# Patient Record
Sex: FEMALE | Race: ASIAN | Employment: PART TIME | URBAN - METROPOLITAN AREA
[De-identification: names, ages, dates, MRNs, and addresses within clinical notes are randomized per-mention and may not be internally consistent; named-entity substitution may affect disease eponyms.]

---

## 2017-04-03 RX ORDER — ESCITALOPRAM OXALATE 10 MG/1
TABLET ORAL
Qty: 30 TAB | Refills: 5 | OUTPATIENT
Start: 2017-04-03

## 2017-06-22 RX ORDER — ESCITALOPRAM OXALATE 10 MG/1
TABLET ORAL
Qty: 30 TAB | Refills: 5 | OUTPATIENT
Start: 2017-06-22

## 2017-06-28 ENCOUNTER — OFFICE VISIT (OUTPATIENT)
Dept: INTERNAL MEDICINE CLINIC | Age: 52
End: 2017-06-28

## 2017-06-28 VITALS
RESPIRATION RATE: 14 BRPM | WEIGHT: 173.4 LBS | TEMPERATURE: 97.4 F | HEART RATE: 109 BPM | SYSTOLIC BLOOD PRESSURE: 128 MMHG | HEIGHT: 64 IN | DIASTOLIC BLOOD PRESSURE: 77 MMHG | BODY MASS INDEX: 29.6 KG/M2 | OXYGEN SATURATION: 99 %

## 2017-06-28 DIAGNOSIS — Z00.00 ROUTINE GENERAL MEDICAL EXAMINATION AT A HEALTH CARE FACILITY: ICD-10-CM

## 2017-06-28 DIAGNOSIS — F34.1 DYSTHYMIA: Primary | ICD-10-CM

## 2017-06-28 RX ORDER — ESCITALOPRAM OXALATE 10 MG/1
TABLET ORAL
Qty: 90 TAB | Refills: 3 | Status: SHIPPED | OUTPATIENT
Start: 2017-06-28 | End: 2017-09-26

## 2017-06-28 NOTE — PROGRESS NOTES
HISTORY OF PRESENT ILLNESS  Kin Late is a 46 y.o. female. HPI  Patient presents today for follow up. She states that her mood is doing well on the Lexapro. She still sees  occasionally and was told she would need lifelong medicine given her genetics - she is working on exercise- working a lot- and feeling happy and content  Patient is exercising and preparing for half marathon. Patient reports that she occasionally follows up with Dr. Eusebia Guo. Review of Systems   All other systems reviewed and are negative. Physical Exam   Constitutional: She is oriented to person, place, and time. She appears well-developed and well-nourished. HENT:   Head: Normocephalic and atraumatic. Right Ear: External ear normal.   Left Ear: External ear normal.   Nose: Nose normal.   Mouth/Throat: Oropharynx is clear and moist.   Eyes: Conjunctivae and EOM are normal.   Neck: Normal range of motion. Neck supple. Carotid bruit is not present. No thyroid mass and no thyromegaly present. Cardiovascular: Normal rate, regular rhythm, S1 normal, S2 normal, normal heart sounds and intact distal pulses. Pulmonary/Chest: Effort normal and breath sounds normal.   Abdominal: Soft. Normal appearance and bowel sounds are normal. There is no hepatosplenomegaly. There is no tenderness. Musculoskeletal: Normal range of motion. Neurological: She is alert and oriented to person, place, and time. She has normal strength. No cranial nerve deficit or sensory deficit. Coordination normal.   Skin: Skin is warm, dry and intact. No abrasion and no rash noted. Psychiatric: She has a normal mood and affect. Her behavior is normal. Judgment and thought content normal.   Nursing note and vitals reviewed. ASSESSMENT and PLAN  Jaime Rangel was seen today for medication evaluation. Diagnoses and all orders for this visit:    Dysthymia  Stable. Continue on Lexapro.  We will not talk about ocming off of it as per pt she never knew she could feel this good    Routine general medical examination at a health care facility- f/u October for physical  -     CBC W/O DIFF  -     METABOLIC PANEL, COMPREHENSIVE  -     LIPID PANEL  -     TSH 3RD GENERATION    Other orders  -     escitalopram oxalate (LEXAPRO) 10 mg tablet; TAKE 1 TAB BY MOUTH DAILY. lab results and schedule of future lab studies reviewed with patient  reviewed diet, exercise and weight control    Written by Ray Warren, as dictated by Nathanael Vo MD.     Current diagnosis and concerns discussed with pt at length. Understands risks and benefits or current treatment plan and medications and accepts the treatment and medication with any possible risks. Pt asks appropriate questions which were answered. Pt instructed to call with any concerns or problems.

## 2017-09-13 DIAGNOSIS — Z00.00 ROUTINE GENERAL MEDICAL EXAMINATION AT A HEALTH CARE FACILITY: Primary | ICD-10-CM

## 2017-09-14 ENCOUNTER — TELEPHONE (OUTPATIENT)
Dept: INTERNAL MEDICINE CLINIC | Age: 52
End: 2017-09-14

## 2017-09-14 LAB
25(OH)D3+25(OH)D2 SERPL-MCNC: 34.1 NG/ML (ref 30–100)
ALBUMIN SERPL-MCNC: 4.3 G/DL (ref 3.5–5.5)
ALBUMIN/GLOB SERPL: 1.7 {RATIO} (ref 1.2–2.2)
ALP SERPL-CCNC: 78 IU/L (ref 39–117)
ALT SERPL-CCNC: 19 IU/L (ref 0–32)
AST SERPL-CCNC: 18 IU/L (ref 0–40)
BILIRUB SERPL-MCNC: 0.8 MG/DL (ref 0–1.2)
BUN SERPL-MCNC: 10 MG/DL (ref 6–24)
BUN/CREAT SERPL: 15 (ref 9–23)
CALCIUM SERPL-MCNC: 9 MG/DL (ref 8.7–10.2)
CHLORIDE SERPL-SCNC: 101 MMOL/L (ref 96–106)
CHOLEST SERPL-MCNC: 254 MG/DL (ref 100–199)
CO2 SERPL-SCNC: 25 MMOL/L (ref 18–29)
CREAT SERPL-MCNC: 0.66 MG/DL (ref 0.57–1)
ERYTHROCYTE [DISTWIDTH] IN BLOOD BY AUTOMATED COUNT: 12.6 % (ref 12.3–15.4)
GLOBULIN SER CALC-MCNC: 2.5 G/DL (ref 1.5–4.5)
GLUCOSE SERPL-MCNC: 98 MG/DL (ref 65–99)
HCT VFR BLD AUTO: 41.7 % (ref 34–46.6)
HDLC SERPL-MCNC: 65 MG/DL
HGB BLD-MCNC: 13.7 G/DL (ref 11.1–15.9)
INTERPRETATION, 910389: NORMAL
LDLC SERPL CALC-MCNC: 164 MG/DL (ref 0–99)
MCH RBC QN AUTO: 31.4 PG (ref 26.6–33)
MCHC RBC AUTO-ENTMCNC: 32.9 G/DL (ref 31.5–35.7)
MCV RBC AUTO: 95 FL (ref 79–97)
PLATELET # BLD AUTO: 354 X10E3/UL (ref 150–379)
POTASSIUM SERPL-SCNC: 4.5 MMOL/L (ref 3.5–5.2)
PROT SERPL-MCNC: 6.8 G/DL (ref 6–8.5)
RBC # BLD AUTO: 4.37 X10E6/UL (ref 3.77–5.28)
SODIUM SERPL-SCNC: 141 MMOL/L (ref 134–144)
TRIGL SERPL-MCNC: 125 MG/DL (ref 0–149)
TSH SERPL DL<=0.005 MIU/L-ACNC: 2.65 UIU/ML (ref 0.45–4.5)
VLDLC SERPL CALC-MCNC: 25 MG/DL (ref 5–40)
WBC # BLD AUTO: 4.7 X10E3/UL (ref 3.4–10.8)

## 2017-09-14 NOTE — TELEPHONE ENCOUNTER
Patient request a return call regarding concerns with her labs being processed.  Patient best contact  [phone] (P)

## 2017-09-26 ENCOUNTER — OFFICE VISIT (OUTPATIENT)
Dept: INTERNAL MEDICINE CLINIC | Age: 52
End: 2017-09-26

## 2017-09-26 VITALS
TEMPERATURE: 98.2 F | HEART RATE: 73 BPM | BODY MASS INDEX: 29.5 KG/M2 | OXYGEN SATURATION: 98 % | WEIGHT: 172.8 LBS | DIASTOLIC BLOOD PRESSURE: 71 MMHG | RESPIRATION RATE: 14 BRPM | SYSTOLIC BLOOD PRESSURE: 112 MMHG | HEIGHT: 64 IN

## 2017-09-26 DIAGNOSIS — Z00.00 ROUTINE GENERAL MEDICAL EXAMINATION AT A HEALTH CARE FACILITY: Primary | ICD-10-CM

## 2017-09-26 RX ORDER — ROSUVASTATIN CALCIUM 5 MG/1
5 TABLET, COATED ORAL
Qty: 30 TAB | Refills: 2 | Status: SHIPPED | OUTPATIENT
Start: 2017-09-26 | End: 2017-12-23 | Stop reason: SDUPTHER

## 2017-09-26 NOTE — PROGRESS NOTES
HISTORY OF PRESENT ILLNESS  Byron Contreras is a 46 y.o. female. HPI  Dysthymia- cont with lexapro and doing well - she is exercising and she is running and walking, going to gym lifting weights  Her lipids are high according to her labs but she does not want to be on medicine for this due to side effects and concerns as to what it means long term as she does not like meds- she is exercising, active- and feeling well but mom had MI at age 54  Review of Systems   Constitutional: Negative for chills, diaphoresis, fever, malaise/fatigue and weight loss. HENT: Negative for congestion, ear pain, hearing loss, sore throat and tinnitus. Eyes: Negative for blurred vision and double vision. Respiratory: Negative for cough, hemoptysis, sputum production, shortness of breath and wheezing. Cardiovascular: Negative for chest pain, palpitations, orthopnea, claudication, leg swelling and PND. Gastrointestinal: Negative for abdominal pain, blood in stool, constipation, diarrhea, heartburn, nausea and vomiting. Genitourinary: Negative for dysuria, flank pain, frequency, hematuria and urgency. Musculoskeletal: Negative for back pain, joint pain, myalgias and neck pain. Skin: Negative. Neurological: Negative for dizziness, tingling, sensory change, speech change, focal weakness, seizures, loss of consciousness, weakness and headaches. Endo/Heme/Allergies: Negative for environmental allergies and polydipsia. Does not bruise/bleed easily. Psychiatric/Behavioral: Negative for depression, memory loss, substance abuse and suicidal ideas. The patient is not nervous/anxious and does not have insomnia. Physical Exam   Constitutional: She is oriented to person, place, and time. She appears well-developed and well-nourished. HENT:   Head: Normocephalic and atraumatic.    Right Ear: External ear normal.   Left Ear: External ear normal.   Nose: Nose normal.   Mouth/Throat: Oropharynx is clear and moist.   Eyes: Conjunctivae and EOM are normal. Pupils are equal, round, and reactive to light. Neck: Normal range of motion. Neck supple. Carotid bruit is not present. No thyromegaly present. Cardiovascular: Normal rate, regular rhythm, S1 normal, S2 normal, normal heart sounds and intact distal pulses. Pulmonary/Chest: Effort normal and breath sounds normal.   Abdominal: Soft. Normal appearance and bowel sounds are normal. There is no hepatosplenomegaly. There is no tenderness. Musculoskeletal: Normal range of motion. Neurological: She is alert and oriented to person, place, and time. Skin: Skin is warm, dry and intact. Psychiatric: She has a normal mood and affect. Her behavior is normal. Judgment and thought content normal.   Nursing note and vitals reviewed. ASSESSMENT and PLAN  Diagnoses and all orders for this visit:    1. Routine general medical examination at a health care facility  -     CRP, HIGH SENSITIVITY  -     CT HEART W/O CONT WITH CALCIUM; Future- explained the CRP and Ct heart could be normal and she still have obstructive heart disease- I think she should take crestor given her [place] she should be on this even if she decides to take it a couple days a week and also see cardiology like Donnell Riggs but she needs to be prepared for the recommendations that they provide as she is against medicine- she is postmenopausal and in her 46s so first is statin   -     rosuvastatin (CRESTOR) 5 mg tablet; Take 1 Tab by mouth nightly.       lab results and schedule of future lab studies reviewed with patient  reviewed diet, exercise and weight control  cardiovascular risk and specific lipid/LDL goals reviewed  reviewed medications and side effects in detail

## 2017-09-27 LAB — CRP SERPL HS-MCNC: 3.72 MG/L (ref 0–3)

## 2017-09-29 ENCOUNTER — HOSPITAL ENCOUNTER (OUTPATIENT)
Dept: CT IMAGING | Age: 52
Discharge: HOME | End: 2017-09-29
Attending: INTERNAL MEDICINE
Payer: SELF-PAY

## 2017-09-29 DIAGNOSIS — Z00.00 ROUTINE GENERAL MEDICAL EXAMINATION AT A HEALTH CARE FACILITY: ICD-10-CM

## 2017-09-29 PROCEDURE — 75571 CT HRT W/O DYE W/CA TEST: CPT

## 2017-10-06 ENCOUNTER — OFFICE VISIT (OUTPATIENT)
Dept: INTERNAL MEDICINE CLINIC | Age: 52
End: 2017-10-06

## 2017-10-06 VITALS
BODY MASS INDEX: 29.16 KG/M2 | WEIGHT: 170.8 LBS | HEIGHT: 64 IN | RESPIRATION RATE: 14 BRPM | DIASTOLIC BLOOD PRESSURE: 83 MMHG | TEMPERATURE: 98 F | HEART RATE: 83 BPM | OXYGEN SATURATION: 98 % | SYSTOLIC BLOOD PRESSURE: 134 MMHG

## 2017-10-06 DIAGNOSIS — R30.9 PAINFUL URINATION: Primary | ICD-10-CM

## 2017-10-06 LAB
BILIRUB UR QL STRIP: NEGATIVE
GLUCOSE UR-MCNC: NEGATIVE MG/DL
KETONES P FAST UR STRIP-MCNC: NEGATIVE MG/DL
PH UR STRIP: 6 [PH] (ref 4.6–8)
PROT UR QL STRIP: NEGATIVE MG/DL
SP GR UR STRIP: 1 (ref 1–1.03)
UA UROBILINOGEN AMB POC: NORMAL (ref 0.2–1)
URINALYSIS CLARITY POC: NORMAL
URINALYSIS COLOR POC: NORMAL
URINE BLOOD POC: NORMAL
URINE LEUKOCYTES POC: NORMAL
URINE NITRITES POC: POSITIVE

## 2017-10-06 RX ORDER — NITROFURANTOIN 25; 75 MG/1; MG/1
100 CAPSULE ORAL 2 TIMES DAILY
Qty: 14 CAP | Refills: 0 | Status: SHIPPED | OUTPATIENT
Start: 2017-10-06 | End: 2018-03-30 | Stop reason: ALTCHOICE

## 2017-10-06 RX ORDER — PHENAZOPYRIDINE HYDROCHLORIDE 200 MG/1
200 TABLET, FILM COATED ORAL DAILY
COMMUNITY
End: 2018-10-31

## 2017-10-06 RX ORDER — NITROFURANTOIN 25; 75 MG/1; MG/1
100 CAPSULE ORAL 2 TIMES DAILY
Qty: 14 CAP | Refills: 0 | Status: SHIPPED | OUTPATIENT
Start: 2017-10-06 | End: 2017-10-06 | Stop reason: SDUPTHER

## 2017-10-06 NOTE — PROGRESS NOTES
HISTORY OF PRESENT ILLNESS  Dorethia Closs is a 46 y.o. female. HPI   Patient reports urinary frequency over the weekend. She states Monday, she followed up with GYN and was given Cipro. She states she started on Cipro and started to have pain 2 days later. She notes Cipro has never been able to clear UTI. Patient states the urine culture was normal and was unable to get another prescription because she was still having urinary pain. Patient states she started to use the estrace cream 3x a week with some relief. Patient reports she had intercourse and it caused painful urination immediately after. She states she will follow up with Dr. Omaira Benítez for new regimen surrounding vaginal irritation that is causing urinary burning. Review of Systems   All other systems reviewed and are negative. Physical Exam   Constitutional: She is oriented to person, place, and time. She appears well-developed and well-nourished. HENT:   Head: Normocephalic and atraumatic. Right Ear: External ear normal.   Left Ear: External ear normal.   Nose: Nose normal.   Mouth/Throat: Oropharynx is clear and moist.   Eyes: Conjunctivae and EOM are normal.   Neck: Normal range of motion. Neck supple. Carotid bruit is not present. No thyroid mass and no thyromegaly present. Cardiovascular: Normal rate, regular rhythm, S1 normal, S2 normal, normal heart sounds and intact distal pulses. Pulmonary/Chest: Effort normal and breath sounds normal.   Abdominal: Soft. Normal appearance and bowel sounds are normal. There is no hepatosplenomegaly. There is no tenderness. Musculoskeletal: Normal range of motion. Neurological: She is alert and oriented to person, place, and time. She has normal strength. No cranial nerve deficit or sensory deficit. Coordination normal.   Skin: Skin is warm, dry and intact. No abrasion and no rash noted. Psychiatric: She has a normal mood and affect.  Her behavior is normal. Judgment and thought content normal. Nursing note and vitals reviewed. ASSESSMENT and PLAN  Diagnoses and all orders for this visit:    1. Painful urination  Urine was clear, will treat as UTI with Macrobid and take urine for culture. Instructed patient to increase Estrace application prior to intercourse. Patient Follow up with Dr. Hilda Pérez. She will not do anything with estrace cream until she sees    -     AMB POC URINALYSIS DIP STICK AUTO W/O MICRO  -     CULTURE, URINE  -     nitrofurantoin, macrocrystal-monohydrate, (MACROBID) 100 mg capsule; Take 1 Cap by mouth two (2) times a day. lab results and schedule of future lab studies reviewed with patient  reviewed diet, exercise and weight control    Written by Joana Almanza, as dictated by Eder Banks MD.     Current diagnosis and concerns discussed with pt at length. Understands risks and benefits or current treatment plan and medications and accepts the treatment and medication with any possible risks. Pt asks appropriate questions which were answered. Pt instructed to call with any concerns or problems.

## 2017-10-11 LAB
BACTERIA UR CULT: ABNORMAL
BACTERIA UR CULT: ABNORMAL

## 2017-12-19 ENCOUNTER — PATIENT MESSAGE (OUTPATIENT)
Dept: INTERNAL MEDICINE CLINIC | Age: 52
End: 2017-12-19

## 2017-12-19 NOTE — TELEPHONE ENCOUNTER
From: Karel Arizmendi  Sent: 12/19/2017 8:05 AM EST  Subject: Visit Follow-Up Question    Please cancel my f/u appt tomorrow and please I need to reschedule it.   Thanks  ShangPin

## 2017-12-23 DIAGNOSIS — Z00.00 ROUTINE GENERAL MEDICAL EXAMINATION AT A HEALTH CARE FACILITY: ICD-10-CM

## 2017-12-23 RX ORDER — ROSUVASTATIN CALCIUM 5 MG/1
TABLET, COATED ORAL
Qty: 30 TAB | Refills: 2 | Status: SHIPPED | OUTPATIENT
Start: 2017-12-23 | End: 2018-03-28 | Stop reason: SDUPTHER

## 2018-03-28 DIAGNOSIS — Z00.00 ROUTINE GENERAL MEDICAL EXAMINATION AT A HEALTH CARE FACILITY: ICD-10-CM

## 2018-03-28 RX ORDER — ROSUVASTATIN CALCIUM 5 MG/1
TABLET, COATED ORAL
Qty: 30 TAB | Refills: 3 | Status: SHIPPED | OUTPATIENT
Start: 2018-03-28 | End: 2018-08-03 | Stop reason: SDUPTHER

## 2018-03-30 ENCOUNTER — OFFICE VISIT (OUTPATIENT)
Dept: INTERNAL MEDICINE CLINIC | Age: 53
End: 2018-03-30

## 2018-03-30 VITALS
SYSTOLIC BLOOD PRESSURE: 120 MMHG | DIASTOLIC BLOOD PRESSURE: 76 MMHG | HEIGHT: 64 IN | RESPIRATION RATE: 14 BRPM | BODY MASS INDEX: 29.16 KG/M2 | OXYGEN SATURATION: 98 % | WEIGHT: 170.8 LBS | HEART RATE: 93 BPM | TEMPERATURE: 98 F

## 2018-03-30 DIAGNOSIS — E78.5 HYPERLIPIDEMIA LDL GOAL <130: Primary | ICD-10-CM

## 2018-03-30 DIAGNOSIS — F34.1 DYSTHYMIA: ICD-10-CM

## 2018-03-30 RX ORDER — NITROFURANTOIN MACROCRYSTALS 50 MG/1
CAPSULE ORAL AS NEEDED
COMMUNITY
End: 2018-03-30

## 2018-03-30 NOTE — PROGRESS NOTES
HISTORY OF PRESENT ILLNESS  Phoebe Kearney is a 48 y.o. female. HPI  Subjective:   Phoebe Kearney is a 48 y.o. female with hyperlipidemia. Cardiovascular risk analysis - 48 y.o. female LDL goal is under 130. ROS: taking medications as instructed, no medication side effects noted, no TIA's, no chest pain on exertion, no dyspnea on exertion, no swelling of ankles. Tolerating meds, no myalgias or other side effects noted  New concerns: stable . Dysthymia- tolerating lexapro and doing well- she would like to get off of it - she feels well     Review of Systems   Constitutional: Negative. Negative for chills, diaphoresis, fever, malaise/fatigue and weight loss. HENT: Negative for congestion, nosebleeds and tinnitus. Eyes: Negative for blurred vision, double vision and photophobia. Respiratory: Negative for cough, hemoptysis, sputum production, shortness of breath and wheezing. Cardiovascular: Negative for chest pain, palpitations, orthopnea, claudication, leg swelling and PND. Gastrointestinal: Negative for abdominal pain, blood in stool, constipation, diarrhea, heartburn, melena, nausea and vomiting. Genitourinary: Negative for dysuria, frequency, hematuria and urgency. Musculoskeletal: Negative for back pain, joint pain, myalgias and neck pain. Skin: Negative for itching and rash. Neurological: Negative for dizziness, tingling, sensory change, speech change, focal weakness, weakness and headaches. Endo/Heme/Allergies: Negative for polydipsia. Does not bruise/bleed easily. Psychiatric/Behavioral: Negative for depression. The patient is not nervous/anxious and does not have insomnia. Physical Exam   Constitutional: She is oriented to person, place, and time. She appears well-developed and well-nourished. HENT:   Head: Normocephalic and atraumatic.    Right Ear: External ear normal.   Left Ear: External ear normal.   Nose: Nose normal.   Mouth/Throat: Oropharynx is clear and moist. Neck: Normal range of motion. Neck supple. No JVD present. Carotid bruit is not present. No thyroid mass and no thyromegaly present. Cardiovascular: Normal rate, regular rhythm, S1 normal, S2 normal, normal heart sounds, intact distal pulses and normal pulses. Exam reveals no gallop and no friction rub. No murmur heard. Pulmonary/Chest: Effort normal and breath sounds normal.   Abdominal: Soft. Bowel sounds are normal.   Musculoskeletal: Normal range of motion. Neurological: She is alert and oriented to person, place, and time. She has normal strength. Skin: Skin is warm and dry. Psychiatric: She has a normal mood and affect. Her behavior is normal. Judgment and thought content normal.   Nursing note and vitals reviewed. ASSESSMENT and PLAN  Diagnoses and all orders for this visit:    1. Hyperlipidemia LDL goal <130-cont with crestor tolerating medicine   -     LIPID PANEL  -     METABOLIC PANEL, COMPREHENSIVE    2.  Dysthymia  -     CBC W/O DIFF      lab results and schedule of future lab studies reviewed with patient  reviewed diet, exercise and weight control  cardiovascular risk and specific lipid/LDL goals reviewed  reviewed medications and side effects in detail

## 2018-04-07 LAB
ALBUMIN SERPL-MCNC: 4.2 G/DL (ref 3.5–5.5)
ALBUMIN/GLOB SERPL: 1.8 {RATIO} (ref 1.2–2.2)
ALP SERPL-CCNC: 74 IU/L (ref 39–117)
ALT SERPL-CCNC: 16 IU/L (ref 0–32)
AST SERPL-CCNC: 20 IU/L (ref 0–40)
BILIRUB SERPL-MCNC: 0.7 MG/DL (ref 0–1.2)
BUN SERPL-MCNC: 15 MG/DL (ref 6–24)
BUN/CREAT SERPL: 19 (ref 9–23)
CALCIUM SERPL-MCNC: 8.8 MG/DL (ref 8.7–10.2)
CHLORIDE SERPL-SCNC: 103 MMOL/L (ref 96–106)
CHOLEST SERPL-MCNC: 155 MG/DL (ref 100–199)
CO2 SERPL-SCNC: 24 MMOL/L (ref 18–29)
CREAT SERPL-MCNC: 0.78 MG/DL (ref 0.57–1)
ERYTHROCYTE [DISTWIDTH] IN BLOOD BY AUTOMATED COUNT: 12.7 % (ref 12.3–15.4)
GFR SERPLBLD CREATININE-BSD FMLA CKD-EPI: 100 ML/MIN/1.73
GFR SERPLBLD CREATININE-BSD FMLA CKD-EPI: 87 ML/MIN/1.73
GLOBULIN SER CALC-MCNC: 2.3 G/DL (ref 1.5–4.5)
GLUCOSE SERPL-MCNC: 89 MG/DL (ref 65–99)
HCT VFR BLD AUTO: 40.4 % (ref 34–46.6)
HDLC SERPL-MCNC: 68 MG/DL
HGB BLD-MCNC: 13.4 G/DL (ref 11.1–15.9)
INTERPRETATION, 910389: NORMAL
LDLC SERPL CALC-MCNC: 68 MG/DL (ref 0–99)
MCH RBC QN AUTO: 31.7 PG (ref 26.6–33)
MCHC RBC AUTO-ENTMCNC: 33.2 G/DL (ref 31.5–35.7)
MCV RBC AUTO: 96 FL (ref 79–97)
PLATELET # BLD AUTO: 354 X10E3/UL (ref 150–379)
POTASSIUM SERPL-SCNC: 4.6 MMOL/L (ref 3.5–5.2)
PROT SERPL-MCNC: 6.5 G/DL (ref 6–8.5)
RBC # BLD AUTO: 4.23 X10E6/UL (ref 3.77–5.28)
SODIUM SERPL-SCNC: 140 MMOL/L (ref 134–144)
TRIGL SERPL-MCNC: 93 MG/DL (ref 0–149)
VLDLC SERPL CALC-MCNC: 19 MG/DL (ref 5–40)
WBC # BLD AUTO: 4.8 X10E3/UL (ref 3.4–10.8)

## 2018-06-18 RX ORDER — ESCITALOPRAM OXALATE 10 MG/1
TABLET ORAL
Qty: 90 TAB | Refills: 3 | Status: SHIPPED | OUTPATIENT
Start: 2018-06-18 | End: 2018-10-31

## 2018-08-03 DIAGNOSIS — Z00.00 ROUTINE GENERAL MEDICAL EXAMINATION AT A HEALTH CARE FACILITY: ICD-10-CM

## 2018-08-03 RX ORDER — ROSUVASTATIN CALCIUM 5 MG/1
TABLET, COATED ORAL
Qty: 30 TAB | Refills: 3 | Status: SHIPPED | OUTPATIENT
Start: 2018-08-03 | End: 2018-10-31 | Stop reason: SDUPTHER

## 2018-10-23 ENCOUNTER — TELEPHONE (OUTPATIENT)
Dept: INTERNAL MEDICINE CLINIC | Age: 53
End: 2018-10-23

## 2018-10-23 NOTE — TELEPHONE ENCOUNTER
[facility] called asking to increase to a 90day script of   rosuvastatin (CRESTOR) 5 mg tablet    Please call back at  [phone] and speak to any pharmacist

## 2018-10-24 ENCOUNTER — TELEPHONE (OUTPATIENT)
Dept: INTERNAL MEDICINE CLINIC | Age: 53
End: 2018-10-24

## 2018-10-31 ENCOUNTER — OFFICE VISIT (OUTPATIENT)
Dept: INTERNAL MEDICINE CLINIC | Age: 53
End: 2018-10-31

## 2018-10-31 VITALS
HEIGHT: 64 IN | HEART RATE: 86 BPM | OXYGEN SATURATION: 100 % | SYSTOLIC BLOOD PRESSURE: 110 MMHG | WEIGHT: 174.4 LBS | RESPIRATION RATE: 18 BRPM | TEMPERATURE: 98 F | BODY MASS INDEX: 29.78 KG/M2 | DIASTOLIC BLOOD PRESSURE: 80 MMHG

## 2018-10-31 DIAGNOSIS — Z00.00 ROUTINE GENERAL MEDICAL EXAMINATION AT A HEALTH CARE FACILITY: ICD-10-CM

## 2018-10-31 DIAGNOSIS — E78.5 HYPERLIPIDEMIA LDL GOAL <130: ICD-10-CM

## 2018-10-31 DIAGNOSIS — R03.0 ELEVATED BLOOD PRESSURE READING: ICD-10-CM

## 2018-10-31 DIAGNOSIS — F41.9 ANXIETY: Primary | ICD-10-CM

## 2018-10-31 RX ORDER — ROSUVASTATIN CALCIUM 5 MG/1
5 TABLET, COATED ORAL
Qty: 90 TAB | Refills: 1 | Status: SHIPPED | OUTPATIENT
Start: 2018-10-31 | End: 2019-05-11 | Stop reason: SDUPTHER

## 2018-10-31 RX ORDER — ESCITALOPRAM OXALATE 10 MG/1
10 TABLET ORAL DAILY
Qty: 90 TAB | Refills: 1 | Status: SHIPPED | OUTPATIENT
Start: 2018-10-31 | End: 2019-07-14 | Stop reason: SDUPTHER

## 2018-10-31 RX ORDER — BIMATOPROST 3 UG/ML
1 SOLUTION TOPICAL
Qty: 5 ML | Refills: 6 | Status: SHIPPED | OUTPATIENT
Start: 2018-10-31

## 2018-10-31 NOTE — PROGRESS NOTES
Deborah Bill is a 48 y.o. female Chief Complaint Patient presents with  Medication Refill 1. Have you been to the ER, urgent care clinic since your last visit? Hospitalized since your last visit? No 
M 
2. Have you seen or consulted any other health care providers outside of the [address] [place] [address] since your last visit? Include any pap smears or colon screening. No 
 
 
Visit Vitals /88 (BP 1 Location: Left arm, BP Patient Position: Sitting) Pulse 86 Temp 98 °F (36.7 °C) (Oral) Resp 18 Ht 5' 4\" (1.626 m) Wt 174 lb 6.4 oz (79.1 kg) SpO2 100% BMI 29.94 kg/m² Health Maintenance Due Topic Date Due  Shingrix Vaccine Age 50> (1 of 2) 01/25/2015  PAP AKA CERVICAL CYTOLOGY  01/01/2016  Influenza Age 5 to Adult  08/01/2018 Medication Reconciliation completed, changes noted.   Please  Update medication list.

## 2018-10-31 NOTE — PROGRESS NOTES
HISTORY OF PRESENT ILLNESS Pavel Hermosillo is a 48 y.o. female. HPI Hyperlipidemia: 
Cardiovascular risk analysis - 48 y.o. female LDL goal is under 130. ROS: taking medications as instructed, no medication side effects noted, no TIA's, no chest pain on exertion, no dyspnea on exertion, no swelling of ankles. Tolerating meds, no myalgias or other side effects noted New concerns: Her last LDL was 68 on 4/07/18. Pt continues on Crestor. Anxiety: Stable, and well-managed with Lexapro. Menopause: Pt reports thinning of eyelashes and eyebrows from menopause. She requests Latisse which dermatologist approved in past.  
 
Pt reports stable BP at home averaging 135/80. Her BP is 138/88 today in clinic. Her BP is 110/80 today in exam room. Review of Systems All other systems reviewed and are negative. Physical Exam  
Constitutional: She is oriented to person, place, and time. She appears well-developed and well-nourished. HENT:  
Head: Normocephalic and atraumatic. Right Ear: External ear normal.  
Left Ear: External ear normal.  
Nose: Nose normal.  
Mouth/Throat: Oropharynx is clear and moist.  
Eyes: Conjunctivae and EOM are normal.  
Neck: Normal range of motion. Neck supple. Carotid bruit is not present. No thyroid mass and no thyromegaly present. Cardiovascular: Normal rate, regular rhythm, S1 normal, S2 normal, normal heart sounds and intact distal pulses. Pulmonary/Chest: Effort normal and breath sounds normal.  
Abdominal: Soft. Normal appearance and bowel sounds are normal. There is no hepatosplenomegaly. There is no tenderness. Musculoskeletal: Normal range of motion. Neurological: She is alert and oriented to person, place, and time. She has normal strength. No cranial nerve deficit or sensory deficit. Coordination normal.  
Skin: Skin is warm, dry and intact. No abrasion and no rash noted. Psychiatric: She has a normal mood and affect.  Her behavior is normal. Judgment and thought content normal.  
Nursing note and vitals reviewed. ASSESSMENT and PLAN Diagnoses and all orders for this visit: 
 
1. Anxiety Stable, and doing well. Continue current medications. 2. Hyperlipidemia LDL goal <130 Stable, patient tolerating meds, no myalgias. I do not recommend any change in medications. 
-     METABOLIC PANEL, COMPREHENSIVE 
-     LIPID PANEL 3. BMI 29.0-29.9,adult Stable condition. Encouraged pt to exercise regularly and monitor diet. 4. Routine general medical examination at a health care facility 
-     rosuvastatin (CRESTOR) 5 mg tablet; Take 1 Tab by mouth nightly. 5. Elevated blood pressure reading Stable condition. Pt is currently not taking meds. Will monitor for any changes or improvements. Much better second check and no need for meds Other orders -     bimatoprost (LATISSE) 0.03 % eyelash solution; 1 Drop by Base of Eyelashes route nightly. -     escitalopram oxalate (LEXAPRO) 10 mg tablet; Take 1 Tab by mouth daily. This note will not be viewable in [address] Lab results and schedule of future lab studies reviewed with patient. Reviewed diet, exercise and weight control. Written by Anamaria Sandoval, as dictated by Lavonna Blizzard, MD.  
 
Current diagnosis and concerns discussed with pt at length. Understands risks and benefits or current treatment plan and medications and accepts the treatment and medication with any possible risks. Pt asks appropriate questions which were answered. Pt instructed to call with any concerns or problems.

## 2018-11-10 LAB
ALBUMIN SERPL-MCNC: 4.4 G/DL (ref 3.5–5.5)
ALBUMIN/GLOB SERPL: 1.6 {RATIO} (ref 1.2–2.2)
ALP SERPL-CCNC: 82 IU/L (ref 39–117)
ALT SERPL-CCNC: 13 IU/L (ref 0–32)
AST SERPL-CCNC: 16 IU/L (ref 0–40)
BILIRUB SERPL-MCNC: 1 MG/DL (ref 0–1.2)
BUN SERPL-MCNC: 13 MG/DL (ref 6–24)
BUN/CREAT SERPL: 19 (ref 9–23)
CALCIUM SERPL-MCNC: 9.1 MG/DL (ref 8.7–10.2)
CHLORIDE SERPL-SCNC: 104 MMOL/L (ref 96–106)
CHOLEST SERPL-MCNC: 174 MG/DL (ref 100–199)
CO2 SERPL-SCNC: 25 MMOL/L (ref 20–29)
CREAT SERPL-MCNC: 0.68 MG/DL (ref 0.57–1)
GLOBULIN SER CALC-MCNC: 2.7 G/DL (ref 1.5–4.5)
GLUCOSE SERPL-MCNC: 83 MG/DL (ref 65–99)
HDLC SERPL-MCNC: 57 MG/DL
INTERPRETATION, 910389: NORMAL
LDLC SERPL CALC-MCNC: 94 MG/DL (ref 0–99)
POTASSIUM SERPL-SCNC: 4.6 MMOL/L (ref 3.5–5.2)
PROT SERPL-MCNC: 7.1 G/DL (ref 6–8.5)
SODIUM SERPL-SCNC: 143 MMOL/L (ref 134–144)
TRIGL SERPL-MCNC: 113 MG/DL (ref 0–149)
VLDLC SERPL CALC-MCNC: 23 MG/DL (ref 5–40)

## 2019-04-11 ENCOUNTER — HOSPITAL ENCOUNTER (OUTPATIENT)
Dept: LAB | Age: 54
Discharge: HOME | End: 2019-04-11
Payer: COMMERCIAL

## 2019-04-11 ENCOUNTER — OFFICE VISIT (OUTPATIENT)
Dept: INTERNAL MEDICINE CLINIC | Age: 54
End: 2019-04-11

## 2019-04-11 VITALS
TEMPERATURE: 98 F | WEIGHT: 173.2 LBS | BODY MASS INDEX: 29.57 KG/M2 | HEIGHT: 64 IN | DIASTOLIC BLOOD PRESSURE: 80 MMHG | OXYGEN SATURATION: 99 % | RESPIRATION RATE: 18 BRPM | HEART RATE: 84 BPM | SYSTOLIC BLOOD PRESSURE: 118 MMHG

## 2019-04-11 DIAGNOSIS — F41.9 ANXIETY: ICD-10-CM

## 2019-04-11 DIAGNOSIS — Z01.419 ROUTINE GYNECOLOGICAL EXAMINATION: Primary | ICD-10-CM

## 2019-04-11 DIAGNOSIS — E78.5 DYSLIPIDEMIA: ICD-10-CM

## 2019-04-11 DIAGNOSIS — Z01.419 PAP SMEAR, LOW-RISK: ICD-10-CM

## 2019-04-11 PROCEDURE — 87624 HPV HI-RISK TYP POOLED RSLT: CPT

## 2019-04-11 PROCEDURE — 88175 CYTOPATH C/V AUTO FLUID REDO: CPT

## 2019-04-11 NOTE — PROGRESS NOTES
HISTORY OF PRESENT ILLNESS Yo Solorzano is a 47 y.o. female. HPI We are going to try to decrease the lexapro 5.0 mg and see how she does and she feels well - she is exercising ; active feeling well Subjective:  
Yo Solorzano is a 47 y.o. female with hyperlipidemia. Cardiovascular risk analysis - 47 y.o. female LDL goal is under 100. ROS: taking medications as instructed, no medication side effects noted, no TIA's, no chest pain on exertion, no dyspnea on exertion, no swelling of ankles. Tolerating meds, no myalgias or other side effects noted New concerns: tolerating medicine . Review of Systems Constitutional: Negative for chills, diaphoresis, fever, malaise/fatigue and weight loss. HENT: Negative for congestion, ear pain, hearing loss, sore throat and tinnitus. Eyes: Negative for blurred vision and double vision. Respiratory: Negative for cough, hemoptysis, sputum production, shortness of breath and wheezing. Cardiovascular: Negative for chest pain, palpitations, orthopnea, claudication, leg swelling and PND. Gastrointestinal: Negative for abdominal pain, blood in stool, constipation, diarrhea, heartburn, nausea and vomiting. Genitourinary: Negative for dysuria, flank pain, frequency, hematuria and urgency. Musculoskeletal: Negative for back pain, joint pain, myalgias and neck pain. Skin: Negative. Neurological: Negative for dizziness, tingling, sensory change, speech change, focal weakness, seizures, loss of consciousness, weakness and headaches. Endo/Heme/Allergies: Negative for environmental allergies and polydipsia. Does not bruise/bleed easily. Psychiatric/Behavioral: Negative for depression, memory loss, substance abuse and suicidal ideas. The patient is not nervous/anxious and does not have insomnia. Physical Exam  
Constitutional: She is oriented to person, place, and time. She appears well-developed and well-nourished.   
HENT:  
 Head: Normocephalic and atraumatic. Right Ear: External ear normal.  
Left Ear: External ear normal.  
Nose: Nose normal.  
Mouth/Throat: Oropharynx is clear and moist.  
Eyes: Pupils are equal, round, and reactive to light. Conjunctivae and EOM are normal.  
Neck: Normal range of motion. Neck supple. Cardiovascular: Normal rate, regular rhythm, normal heart sounds and intact distal pulses. Pulmonary/Chest: Effort normal and breath sounds normal. Right breast exhibits no inverted nipple, no mass, no nipple discharge, no skin change and no tenderness. Left breast exhibits no inverted nipple, no mass, no nipple discharge, no skin change and no tenderness. No breast swelling, tenderness, discharge or bleeding. Breasts are symmetrical.  
Abdominal: Soft. Bowel sounds are normal.  
Genitourinary: Rectum normal and vagina normal. Rectal exam shows anal tone normal. No breast swelling, tenderness, discharge or bleeding. Musculoskeletal: Normal range of motion. Neurological: She is alert and oriented to person, place, and time. Skin: Skin is warm and dry. Psychiatric: She has a normal mood and affect. Her behavior is normal. Judgment and thought content normal.  
Nursing note and vitals reviewed. ASSESSMENT and PLAN Diagnoses and all orders for this visit: 1. Routine gynecological examination 
-     CBC W/O DIFF 
-     TSH 3RD GENERATION 2. Pap smear, low-risk 
-     PAP IG, APTIMA HPV AND RFX 16/18,45 ([id number]) 3. Dyslipidemia -     LIPID PANEL 
-     METABOLIC PANEL, COMPREHENSIVE 4. Anxiety-cont with lexapro 5.0 mg once a day This note will not be viewable in [address] lab results and schedule of future lab studies reviewed with patient 
reviewed diet, exercise and weight control 
cardiovascular risk and specific lipid/LDL goals reviewed 
reviewed medications and side effects in detail

## 2019-04-12 DIAGNOSIS — F41.9 ANXIETY: Primary | ICD-10-CM

## 2019-04-13 LAB
25(OH)D3+25(OH)D2 SERPL-MCNC: 32.4 NG/ML (ref 30–100)
ALBUMIN SERPL-MCNC: 4.3 G/DL (ref 3.5–5.5)
ALBUMIN/GLOB SERPL: 1.7 {RATIO} (ref 1.2–2.2)
ALP SERPL-CCNC: 80 IU/L (ref 39–117)
ALT SERPL-CCNC: 20 IU/L (ref 0–32)
AST SERPL-CCNC: 20 IU/L (ref 0–40)
BILIRUB SERPL-MCNC: 0.8 MG/DL (ref 0–1.2)
BUN SERPL-MCNC: 14 MG/DL (ref 6–24)
BUN/CREAT SERPL: 18 (ref 9–23)
CALCIUM SERPL-MCNC: 8.9 MG/DL (ref 8.7–10.2)
CHLORIDE SERPL-SCNC: 107 MMOL/L (ref 96–106)
CHOLEST SERPL-MCNC: 169 MG/DL (ref 100–199)
CO2 SERPL-SCNC: 22 MMOL/L (ref 20–29)
CREAT SERPL-MCNC: 0.77 MG/DL (ref 0.57–1)
ERYTHROCYTE [DISTWIDTH] IN BLOOD BY AUTOMATED COUNT: 12.8 % (ref 12.3–15.4)
GLOBULIN SER CALC-MCNC: 2.5 G/DL (ref 1.5–4.5)
GLUCOSE SERPL-MCNC: 99 MG/DL (ref 65–99)
HCT VFR BLD AUTO: 42.5 % (ref 34–46.6)
HDLC SERPL-MCNC: 61 MG/DL
HGB BLD-MCNC: 13.7 G/DL (ref 11.1–15.9)
INTERPRETATION, 910389: NORMAL
LDLC SERPL CALC-MCNC: 88 MG/DL (ref 0–99)
MCH RBC QN AUTO: 31.4 PG (ref 26.6–33)
MCHC RBC AUTO-ENTMCNC: 32.2 G/DL (ref 31.5–35.7)
MCV RBC AUTO: 97 FL (ref 79–97)
PLATELET # BLD AUTO: 326 X10E3/UL (ref 150–379)
POTASSIUM SERPL-SCNC: 4.5 MMOL/L (ref 3.5–5.2)
PROT SERPL-MCNC: 6.8 G/DL (ref 6–8.5)
RBC # BLD AUTO: 4.37 X10E6/UL (ref 3.77–5.28)
SODIUM SERPL-SCNC: 142 MMOL/L (ref 134–144)
TRIGL SERPL-MCNC: 100 MG/DL (ref 0–149)
TSH SERPL DL<=0.005 MIU/L-ACNC: 3.26 UIU/ML (ref 0.45–4.5)
VLDLC SERPL CALC-MCNC: 20 MG/DL (ref 5–40)
WBC # BLD AUTO: 4.3 X10E3/UL (ref 3.4–10.8)

## 2019-05-11 DIAGNOSIS — Z00.00 ROUTINE GENERAL MEDICAL EXAMINATION AT A HEALTH CARE FACILITY: ICD-10-CM

## 2019-05-11 RX ORDER — ROSUVASTATIN CALCIUM 5 MG/1
TABLET, COATED ORAL
Qty: 90 TAB | Refills: 1 | Status: SHIPPED | OUTPATIENT
Start: 2019-05-11 | End: 2019-11-09 | Stop reason: SDUPTHER

## 2019-07-14 RX ORDER — ESCITALOPRAM OXALATE 10 MG/1
TABLET ORAL
Qty: 90 TAB | Refills: 1 | Status: SHIPPED | OUTPATIENT
Start: 2019-07-14 | End: 2020-02-19 | Stop reason: SDUPTHER

## 2019-11-09 DIAGNOSIS — Z00.00 ROUTINE GENERAL MEDICAL EXAMINATION AT A HEALTH CARE FACILITY: ICD-10-CM

## 2019-11-09 RX ORDER — ROSUVASTATIN CALCIUM 5 MG/1
TABLET, COATED ORAL
Qty: 90 TAB | Refills: 1 | Status: SHIPPED | OUTPATIENT
Start: 2019-11-09 | End: 2020-05-13 | Stop reason: SDUPTHER

## 2020-02-19 RX ORDER — ESCITALOPRAM OXALATE 10 MG/1
TABLET ORAL
Qty: 90 TAB | Refills: 0 | Status: SHIPPED | OUTPATIENT
Start: 2020-02-19 | End: 2021-02-23 | Stop reason: SDUPTHER

## 2020-03-31 ENCOUNTER — TELEPHONE (OUTPATIENT)
Dept: INTERNAL MEDICINE CLINIC | Age: 55
End: 2020-03-31

## 2020-04-01 ENCOUNTER — VIRTUAL VISIT (OUTPATIENT)
Dept: INTERNAL MEDICINE CLINIC | Age: 55
End: 2020-04-01

## 2020-04-01 DIAGNOSIS — F41.9 ANXIETY: ICD-10-CM

## 2020-04-01 DIAGNOSIS — N95.2 ATROPHIC VAGINITIS: ICD-10-CM

## 2020-04-01 DIAGNOSIS — E78.2 MIXED HYPERLIPIDEMIA: Primary | ICD-10-CM

## 2020-04-01 DIAGNOSIS — Z00.00 ROUTINE GENERAL MEDICAL EXAMINATION AT A HEALTH CARE FACILITY: ICD-10-CM

## 2020-04-01 DIAGNOSIS — E55.9 VITAMIN D DEFICIENCY: ICD-10-CM

## 2020-04-01 NOTE — PROGRESS NOTES
HISTORY OF PRESENT ILLNESS  Trell Merrill is a 54 y.o. female. HPI   Pt is present for her virtual visit through [employer] and consented     Subjective:   Trell Merrill is a 54 y.o. female with hyperlipidemia. Cardiovascular risk analysis - 54 y.o. female LDL goal is under 130. ROS: taking medications as instructed, no medication side effects noted, no TIA's, no chest pain on exertion, no dyspnea on exertion, no swelling of ankles. Tolerating meds, no myalgias or other side effects noted  New concerns: stable. Eating oatmeal and exercise     Pt is taking her lexapro- she tried to go down 1/2 and that did not help ; she Is back up- she was training to [facility] and now will be in Sept- [place] [employer] [place] ; doing chess, puzzles and reading ; trying to eat healthy     Atrophic vaginitis  - she is not doing the estrogen cream and did some research - membrasin - expensive and research in [place] [facility] - plant based omega 7 oil - 2 pills a day for first 3 months - one a day after that- doing vaginal probiotic     Review of Systems   Constitutional: Negative. Negative for chills, diaphoresis, fever, malaise/fatigue and weight loss. HENT: Negative for congestion, nosebleeds and tinnitus. Eyes: Negative for blurred vision, double vision and photophobia. Respiratory: Negative for cough, hemoptysis, sputum production, shortness of breath and wheezing. Cardiovascular: Negative for chest pain, palpitations, orthopnea, claudication, leg swelling and PND. Gastrointestinal: Negative for abdominal pain, blood in stool, constipation, diarrhea, heartburn, melena, nausea and vomiting. Genitourinary: Negative for dysuria, frequency, hematuria and urgency. Musculoskeletal: Negative for back pain, joint pain, myalgias and neck pain. Skin: Negative for itching and rash. Neurological: Negative for dizziness, tingling, sensory change, speech change, focal weakness, weakness and headaches.    Endo/Heme/Allergies: Negative for polydipsia. Does not bruise/bleed easily. Psychiatric/Behavioral: Negative for depression. The patient is not nervous/anxious and does not have insomnia. Physical Exam  Vitals signs reviewed. Constitutional:       General: She is not in acute distress. Appearance: Normal appearance. She is not ill-appearing, toxic-appearing or diaphoretic. HENT:      Right Ear: Hearing normal.      Left Ear: Hearing normal.      Nose: Nose normal.      Mouth/Throat:      Mouth: Mucous membranes are moist.      Pharynx: Oropharynx is clear. Eyes:      Conjunctiva/sclera: Conjunctivae normal.   Neck:      Musculoskeletal: Normal range of motion. Pulmonary:      Effort: No respiratory distress. Breath sounds: Normal air entry. Musculoskeletal: Normal range of motion. Skin:     General: Skin is warm and dry. Neurological:      General: No focal deficit present. Mental Status: She is alert and oriented to person, place, and time. Mental status is at baseline. Psychiatric:         Mood and Affect: Mood normal.         Behavior: Behavior normal.         Thought Content: Thought content normal.         Judgment: Judgment normal.         ASSESSMENT and PLAN  Diagnoses and all orders for this visit:    1. Mixed hyperlipidemia- cont with crestor -     METABOLIC PANEL, COMPREHENSIVE; Future  -     LIPID PANEL; Future    2. Atrophic vaginitis-stable on home med    3. Anxiety-cont with current meds and cont exercise   -     TSH 3RD GENERATION; Future    4. Vitamin D deficiency  -     VITAMIN D, 25 HYDROXY; Future    5. Routine general medical examination at a health care facility  -     CBC W/O DIFF;  Future      lab results and schedule of future lab studies reviewed with patient  reviewed diet, exercise and weight control  cardiovascular risk and specific lipid/LDL goals reviewed  reviewed medications and side effects in detail

## 2020-05-13 DIAGNOSIS — Z00.00 ROUTINE GENERAL MEDICAL EXAMINATION AT A HEALTH CARE FACILITY: ICD-10-CM

## 2020-05-13 RX ORDER — ROSUVASTATIN CALCIUM 5 MG/1
5 TABLET, COATED ORAL
Qty: 90 TAB | Refills: 1 | Status: SHIPPED | OUTPATIENT
Start: 2020-05-13 | End: 2020-11-02

## 2020-05-21 RX ORDER — ESCITALOPRAM OXALATE 10 MG/1
10 TABLET ORAL DAILY
Qty: 30 TAB | Refills: 3 | Status: SHIPPED | OUTPATIENT
Start: 2020-05-21 | End: 2020-10-21 | Stop reason: SDUPTHER

## 2020-09-04 DIAGNOSIS — Z20.822 EXPOSURE TO COVID-19 VIRUS: Primary | ICD-10-CM

## 2020-09-05 LAB
25(OH)D3+25(OH)D2 SERPL-MCNC: 43.5 NG/ML (ref 30–100)
ALBUMIN SERPL-MCNC: 4.3 G/DL (ref 3.8–4.9)
ALBUMIN/GLOB SERPL: 1.6 {RATIO} (ref 1.2–2.2)
ALP SERPL-CCNC: 97 IU/L (ref 39–117)
ALT SERPL-CCNC: 16 IU/L (ref 0–32)
AST SERPL-CCNC: 17 IU/L (ref 0–40)
BILIRUB SERPL-MCNC: 0.7 MG/DL (ref 0–1.2)
BUN SERPL-MCNC: 13 MG/DL (ref 6–24)
BUN/CREAT SERPL: 15 (ref 9–23)
CALCIUM SERPL-MCNC: 9.4 MG/DL (ref 8.7–10.2)
CHLORIDE SERPL-SCNC: 104 MMOL/L (ref 96–106)
CHOLEST SERPL-MCNC: 158 MG/DL (ref 100–199)
CO2 SERPL-SCNC: 23 MMOL/L (ref 20–29)
CREAT SERPL-MCNC: 0.88 MG/DL (ref 0.57–1)
ERYTHROCYTE [DISTWIDTH] IN BLOOD BY AUTOMATED COUNT: 11.4 % (ref 11.7–15.4)
GLOBULIN SER CALC-MCNC: 2.7 G/DL (ref 1.5–4.5)
GLUCOSE SERPL-MCNC: 95 MG/DL (ref 65–99)
HCT VFR BLD AUTO: 41.7 % (ref 34–46.6)
HDLC SERPL-MCNC: 53 MG/DL
HGB BLD-MCNC: 14.1 G/DL (ref 11.1–15.9)
INTERPRETATION, 910389: NORMAL
LDLC SERPL CALC-MCNC: 82 MG/DL (ref 0–99)
MCH RBC QN AUTO: 31.5 PG (ref 26.6–33)
MCHC RBC AUTO-ENTMCNC: 33.8 G/DL (ref 31.5–35.7)
MCV RBC AUTO: 93 FL (ref 79–97)
PLATELET # BLD AUTO: 382 X10E3/UL (ref 150–450)
POTASSIUM SERPL-SCNC: 5.3 MMOL/L (ref 3.5–5.2)
PROT SERPL-MCNC: 7 G/DL (ref 6–8.5)
RBC # BLD AUTO: 4.48 X10E6/UL (ref 3.77–5.28)
SARS-COV-2 AB, IGG, CORG1M: NEGATIVE
SODIUM SERPL-SCNC: 140 MMOL/L (ref 134–144)
TRIGL SERPL-MCNC: 130 MG/DL (ref 0–149)
TSH SERPL DL<=0.005 MIU/L-ACNC: 3.15 UIU/ML (ref 0.45–4.5)
VLDLC SERPL CALC-MCNC: 23 MG/DL (ref 5–40)
WBC # BLD AUTO: 5.7 X10E3/UL (ref 3.4–10.8)

## 2020-10-21 RX ORDER — ESCITALOPRAM OXALATE 10 MG/1
10 TABLET ORAL DAILY
Qty: 30 TAB | Refills: 3 | Status: SHIPPED | OUTPATIENT
Start: 2020-10-21 | End: 2021-02-23 | Stop reason: SDUPTHER

## 2020-11-02 DIAGNOSIS — Z00.00 ROUTINE GENERAL MEDICAL EXAMINATION AT A HEALTH CARE FACILITY: ICD-10-CM

## 2020-11-02 RX ORDER — ROSUVASTATIN CALCIUM 5 MG/1
TABLET, COATED ORAL
Qty: 90 TAB | Refills: 1 | Status: SHIPPED | OUTPATIENT
Start: 2020-11-02 | End: 2021-04-28

## 2021-02-23 RX ORDER — ESCITALOPRAM OXALATE 10 MG/1
10 TABLET ORAL DAILY
Qty: 30 TAB | Refills: 3 | Status: CANCELLED | OUTPATIENT
Start: 2021-02-23

## 2021-02-23 RX ORDER — ESCITALOPRAM OXALATE 10 MG/1
10 TABLET ORAL DAILY
Qty: 30 TAB | Refills: 0 | Status: SHIPPED | OUTPATIENT
Start: 2021-02-23 | End: 2021-03-18

## 2021-02-24 ENCOUNTER — VIRTUAL VISIT (OUTPATIENT)
Dept: INTERNAL MEDICINE CLINIC | Age: 56
End: 2021-02-24
Payer: COMMERCIAL

## 2021-02-24 DIAGNOSIS — M25.50 ARTHRALGIA, UNSPECIFIED JOINT: ICD-10-CM

## 2021-02-24 DIAGNOSIS — E78.2 MIXED HYPERLIPIDEMIA: ICD-10-CM

## 2021-02-24 DIAGNOSIS — F41.9 ANXIETY: Primary | ICD-10-CM

## 2021-02-24 PROCEDURE — 99214 OFFICE O/P EST MOD 30 MIN: CPT | Performed by: INTERNAL MEDICINE

## 2021-02-24 NOTE — PROGRESS NOTES
Bishop Lee (: 1965) is a 64 y.o. female, established patient, here for evaluation of the following chief complaint(s):   Medication Evaluation       ASSESSMENT/PLAN:  1. Anxiety  Not at goal. Had conversation with pt that I would recommend she start exercising regularly again (4 days a week) rather than increasing her meds. Pt agrees. Will consider changing pt's Lexapro dosage if she continues to experience constant anxiety while exercising. Will continue to monitor for improvements or changes. 2. Mixed hyperlipidemia  Stable, patient is tolerating medications, no myalgias. I do not recommend any change in medications. Arthralgia   Not at goal. Had conversation with pt that she can apply OTC Voltaren gel and comply with Tylenol PRN to manage joint pain. Reminded pt to comply with vitamin D supplements consistently. Will continue to monitor for improvements or changes. SUBJECTIVE/OBJECTIVE:  HPI  Pt reports that she has been exercising regularly with her puppy. She notes that she now feels comfortable going to the gym. Mood: Pt reports that her anxiety is more stable with Lexapro 10 mg. She notes that she overall mood has improved but she has underlying anxiety. Hyperlipidemia:  Cardiovascular risk analysis - 64 y.o. female LDL goal is under 130. ROS: taking medications as instructed, no medication side effects noted, no TIA's, no chest pain on exertion, no dyspnea on exertion, no swelling of ankles. Tolerating meds, no myalgias or other side effects noted. New concerns: Pt's last LDL was 82 on 20. Continues on Crestor 5 mg. Arthralgia: Not stable with flare reported. Pt reports that she is UTD with both doses of the COVID vaccine. Review of Systems   Musculoskeletal: Positive for arthralgias. All other systems reviewed and are negative. No flowsheet data found. Physical Exam  Constitutional:       Appearance: Normal appearance.    HENT:      Head: Normocephalic and atraumatic. Nose: Nose normal.      Mouth/Throat:      Mouth: Mucous membranes are moist.   Eyes:      Extraocular Movements: Extraocular movements intact. Neck:      Musculoskeletal: Normal range of motion. Pulmonary:      Effort: Pulmonary effort is normal.   Musculoskeletal: Normal range of motion. Neurological:      General: No focal deficit present. Mental Status: She is oriented to person, place, and time. Psychiatric:         Mood and Affect: Mood normal.         Behavior: Behavior normal.         Thought Content: Thought content normal.         Judgment: Judgment normal.             Torie Kirk is being evaluated by a Virtual Visit (video visit) encounter to address concerns as mentioned above. A caregiver was present when appropriate. Due to this being a TeleHealth encounter (During NWOSX-73 public health emergency), evaluation of the following organ systems was limited: Vitals/Constitutional/EENT/Resp/CV/GI//MS/Neuro/Skin/Heme-Lymph-Imm. Pursuant to the emergency declaration under the [address] [place] [address] authority and the [employer] and [employer] Act, this Virtual Visit was conducted with patient's (and/or legal guardian's) consent, to reduce the patient's risk of exposure to COVID-19 and provide necessary medical care. The patient (and/or legal guardian) has also been advised to contact this office for worsening conditions or problems, and seek emergency medical treatment and/or call 911 if deemed necessary. Patient identification was verified at the start of the visit: YES    Services were provided through a video synchronous discussion virtually to substitute for in-person clinic visit. Patient was located at home and provider was located in office or at home. An electronic signature was used to authenticate this note.   -- Christina Santiago

## 2021-03-25 RX ORDER — ESCITALOPRAM OXALATE 10 MG/1
TABLET ORAL
Qty: 90 TAB | Refills: 1 | Status: SHIPPED | OUTPATIENT
Start: 2021-03-25 | End: 2021-12-14

## 2021-04-28 DIAGNOSIS — Z00.00 ROUTINE GENERAL MEDICAL EXAMINATION AT A HEALTH CARE FACILITY: ICD-10-CM

## 2021-04-28 RX ORDER — ROSUVASTATIN CALCIUM 5 MG/1
TABLET, COATED ORAL
Qty: 90 TAB | Refills: 1 | Status: SHIPPED | OUTPATIENT
Start: 2021-04-28 | End: 2021-10-14 | Stop reason: SDUPTHER

## 2021-07-19 ENCOUNTER — TELEPHONE (OUTPATIENT)
Dept: INTERNAL MEDICINE CLINIC | Age: 56
End: 2021-07-19

## 2021-07-19 DIAGNOSIS — Z11.1 SCREENING FOR TUBERCULOSIS: Primary | ICD-10-CM

## 2021-07-19 NOTE — TELEPHONE ENCOUNTER
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 For graduate school.  Please order thru [facility].  Please call pt and let her know

## 2021-07-22 LAB
GAMMA INTERFERON BACKGROUND BLD IA-ACNC: 0.16 IU/ML
M TB IFN-G BLD-IMP: NEGATIVE
M TB IFN-G CD4+ BCKGRND COR BLD-ACNC: 0.21 IU/ML
MITOGEN IGNF BLD-ACNC: >10 IU/ML
QUANTIFERON INCUBATION, QF1T: NORMAL
QUANTIFERON TB2 AG: 0.21 IU/ML
SERVICE CMNT-IMP: NORMAL

## 2021-08-18 DIAGNOSIS — E78.5 HYPERLIPIDEMIA LDL GOAL <100: ICD-10-CM

## 2021-08-18 DIAGNOSIS — Z11.59 NEED FOR HEPATITIS B SCREENING TEST: Primary | ICD-10-CM

## 2021-08-18 DIAGNOSIS — Z00.00 ROUTINE GENERAL MEDICAL EXAMINATION AT A HEALTH CARE FACILITY: ICD-10-CM

## 2021-08-25 LAB
25(OH)D3+25(OH)D2 SERPL-MCNC: 34 NG/ML (ref 30–100)
ALBUMIN SERPL-MCNC: 4.5 G/DL (ref 3.8–4.9)
ALBUMIN/GLOB SERPL: 1.7 {RATIO} (ref 1.2–2.2)
ALP SERPL-CCNC: 105 IU/L (ref 48–121)
ALT SERPL-CCNC: 25 IU/L (ref 0–32)
AST SERPL-CCNC: 23 IU/L (ref 0–40)
BILIRUB SERPL-MCNC: 1.1 MG/DL (ref 0–1.2)
BUN SERPL-MCNC: 12 MG/DL (ref 6–24)
BUN/CREAT SERPL: 16 (ref 9–23)
CALCIUM SERPL-MCNC: 9.4 MG/DL (ref 8.7–10.2)
CHLORIDE SERPL-SCNC: 103 MMOL/L (ref 96–106)
CHOLEST SERPL-MCNC: 196 MG/DL (ref 100–199)
CO2 SERPL-SCNC: 23 MMOL/L (ref 20–29)
CREAT SERPL-MCNC: 0.75 MG/DL (ref 0.57–1)
ERYTHROCYTE [DISTWIDTH] IN BLOOD BY AUTOMATED COUNT: 11.6 % (ref 11.7–15.4)
GLOBULIN SER CALC-MCNC: 2.7 G/DL (ref 1.5–4.5)
GLUCOSE SERPL-MCNC: 97 MG/DL (ref 65–99)
HBV SURFACE AB SER-ACNC: 189.9 MIU/ML
HCT VFR BLD AUTO: 43.6 % (ref 34–46.6)
HDLC SERPL-MCNC: 56 MG/DL
HGB BLD-MCNC: 14.3 G/DL (ref 11.1–15.9)
IMP & REVIEW OF LAB RESULTS: NORMAL
LDLC SERPL CALC-MCNC: 122 MG/DL (ref 0–99)
MCH RBC QN AUTO: 31 PG (ref 26.6–33)
MCHC RBC AUTO-ENTMCNC: 32.8 G/DL (ref 31.5–35.7)
MCV RBC AUTO: 95 FL (ref 79–97)
PLATELET # BLD AUTO: 358 X10E3/UL (ref 150–450)
POTASSIUM SERPL-SCNC: 4.5 MMOL/L (ref 3.5–5.2)
PROT SERPL-MCNC: 7.2 G/DL (ref 6–8.5)
RBC # BLD AUTO: 4.61 X10E6/UL (ref 3.77–5.28)
SODIUM SERPL-SCNC: 139 MMOL/L (ref 134–144)
TRIGL SERPL-MCNC: 99 MG/DL (ref 0–149)
TSH SERPL DL<=0.005 MIU/L-ACNC: 2.93 UIU/ML (ref 0.45–4.5)
VLDLC SERPL CALC-MCNC: 18 MG/DL (ref 5–40)
WBC # BLD AUTO: 5.4 X10E3/UL (ref 3.4–10.8)

## 2021-09-08 ENCOUNTER — TRANSCRIBE ORDER (OUTPATIENT)
Dept: REGISTRATION | Age: 56
End: 2021-09-08

## 2021-09-08 DIAGNOSIS — Z29.9 PREVENTIVE MEASURE: Primary | ICD-10-CM

## 2021-10-14 DIAGNOSIS — Z00.00 ROUTINE GENERAL MEDICAL EXAMINATION AT A HEALTH CARE FACILITY: ICD-10-CM

## 2021-10-15 RX ORDER — ROSUVASTATIN CALCIUM 5 MG/1
5 TABLET, COATED ORAL
Qty: 90 TABLET | Refills: 1 | Status: SHIPPED | OUTPATIENT
Start: 2021-10-15 | End: 2022-04-02

## 2022-02-27 ENCOUNTER — HOSPITAL ENCOUNTER (OUTPATIENT)
Dept: CT IMAGING | Age: 57
Discharge: HOME | End: 2022-02-27
Attending: INTERNAL MEDICINE
Payer: SELF-PAY

## 2022-02-27 DIAGNOSIS — Z29.9 PREVENTIVE MEASURE: ICD-10-CM

## 2022-02-27 PROCEDURE — 75571 CT HRT W/O DYE W/CA TEST: CPT

## 2022-04-02 DIAGNOSIS — Z00.00 ROUTINE GENERAL MEDICAL EXAMINATION AT A HEALTH CARE FACILITY: ICD-10-CM

## 2022-04-02 RX ORDER — ROSUVASTATIN CALCIUM 5 MG/1
TABLET, COATED ORAL
Qty: 30 TABLET | Refills: 0 | Status: SHIPPED | OUTPATIENT
Start: 2022-04-02 | End: 2022-04-28

## 2022-04-13 NOTE — PROGRESS NOTES
Sesar Dash (: 1965) is a 62 y.o. female, established patient, here for evaluation of the following chief complaint(s):   Medication Refill       ASSESSMENT/PLAN:  Below is the assessment and plan developed based on review of pertinent history, labs, studies, and medications. 1. Anxiety  Continue with therapy to come to terms with daughter leaving for school in addition to taking Lexapro. 2. Mixed hyperlipidemia  CT heart scan was stable. Will continue to monitor for improvements or changes after her cardiac workup with Dr. Kari Rangel. Continue with ongoing regimen of Crestor      SUBJECTIVE/OBJECTIVE:  HPI     Mood: Pt resumed therapy after her daughter left for school. She believes that therapy is helping her cope with the move and expresses satisfaction with her current regimen. Pt expresses interest in discontinuing her regimen in the future. Hyperlipidemia:  Cardiovascular risk analysis - 62 y.o. female LDL goal is under 130. ROS: taking medications as instructed, no medication side effects noted, no TIA's, no chest pain on exertion, no dyspnea on exertion, no swelling of ankles. Tolerating meds, no myalgias or other side effects noted. New concerns: Pt's last LDL was 122 on 21. Abnormal breast scan: Pt expresses frustration about the longevity of her breast scans. H/M: Pt is exercising regularly since retiring. Review of Systems   All other systems reviewed and are negative.        No data recorded     Physical Exam    [INSTRUCTIONS:  \"[x]\" Indicates a positive item  \"[]\" Indicates a negative item  -- DELETE ALL ITEMS NOT EXAMINED]    Constitutional: [x] Appears well-developed and well-nourished [x] No apparent distress      [] Abnormal -     Mental status: [x] Alert and awake  [x] Oriented to person/place/time [x] Able to follow commands    [] Abnormal -     Eyes:   EOM    [x]  Normal    [] Abnormal -   Sclera  [x]  Normal    [] Abnormal -          Discharge [x]  None visible [] Abnormal -     HENT: [x] Normocephalic, atraumatic  [] Abnormal -   [x] Mouth/Throat: Mucous membranes are moist    External Ears [x] Normal  [] Abnormal -    Neck: [x] No visualized mass [] Abnormal -     Pulmonary/Chest: [x] Respiratory effort normal   [x] No visualized signs of difficulty breathing or respiratory distress        [] Abnormal -      Musculoskeletal:   [x] Normal gait with no signs of ataxia         [x] Normal range of motion of neck        [] Abnormal -     Neurological:        [x] No Facial Asymmetry (Cranial nerve 7 motor function) (limited exam due to video visit)          [x] No gaze palsy        [] Abnormal -          Skin:        [x] No significant exanthematous lesions or discoloration noted on facial skin         [] Abnormal -            Psychiatric:       [x] Normal Affect [] Abnormal -        [x] No Hallucinations    Other pertinent observable physical exam findings:-        On this date 04/14/2022 I have spent 30 minutes reviewing previous notes, test results and face to face (virtual) with the patient discussing the diagnosis and importance of compliance with the treatment plan as well as documenting on the day of the visit. Devota Ge[place], was evaluated through a synchronous (real-time) audio-video encounter. The patient (or guardian if applicable) is aware that this is a billable service, which includes applicable co-pays. Verbal consent to proceed has been obtained. The visit was conducted pursuant to the emergency declaration under the [address] [place] [address], [address] [place] [address] authority and the [employer] and [employer] General Act. Patient identification was verified, and a caregiver was present when appropriate. The patient was located at home in a state where the provider was licensed to provide care. An electronic signature was used to authenticate this note.   Written by Rick Burch as dictated by Dr. Alexander Bennett Tatiana.    -- Didier Whiting

## 2022-04-14 ENCOUNTER — VIRTUAL VISIT (OUTPATIENT)
Dept: INTERNAL MEDICINE CLINIC | Age: 57
End: 2022-04-14
Payer: COMMERCIAL

## 2022-04-14 DIAGNOSIS — E78.2 MIXED HYPERLIPIDEMIA: ICD-10-CM

## 2022-04-14 DIAGNOSIS — F41.9 ANXIETY: Primary | ICD-10-CM

## 2022-04-14 PROCEDURE — 99214 OFFICE O/P EST MOD 30 MIN: CPT | Performed by: INTERNAL MEDICINE

## 2022-04-14 RX ORDER — ESCITALOPRAM OXALATE 10 MG/1
10 TABLET ORAL DAILY
Qty: 90 TABLET | Refills: 1 | Status: SHIPPED | OUTPATIENT
Start: 2022-04-14 | End: 2022-10-09

## 2022-06-14 ENCOUNTER — E-VISIT (OUTPATIENT)
Dept: INTERNAL MEDICINE CLINIC | Age: 57
End: 2022-06-14

## 2022-06-15 ENCOUNTER — VIRTUAL VISIT (OUTPATIENT)
Dept: INTERNAL MEDICINE CLINIC | Age: 57
End: 2022-06-15
Payer: COMMERCIAL
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
## 2022-06-15 DIAGNOSIS — S80.862A INSECT BITE OF LEFT LOWER LEG, INITIAL ENCOUNTER: Primary | ICD-10-CM

## 2022-06-15 PROCEDURE — 99213 OFFICE O/P EST LOW 20 MIN: CPT | Performed by: INTERNAL MEDICINE

## 2022-06-15 NOTE — PROGRESS NOTES
Alverta Leyden (: 1965) is a 62 y.o. female, established patient, here for evaluation of the following chief complaint(s):   Insect Bite (upper L thigh)       ASSESSMENT/PLAN:  Below is the assessment and plan developed based on review of pertinent history, labs, studies, and medications. 1. Insect bite of left lower leg, initial encounter  Based on photos and virtual apt, it does not seem infected or like a tick bite. I recommended that she apply hydrocortisone and take Zrytec or Claritin to improve swelling. I will send doxycycline to be taken if she develop chills, HA, or streaking. SUBJECTIVE/OBJECTIVE:  HPI    Bug bite: Pt presents virtually for evaluation of a bug bite on her leg, which she noticed 22 and is concerned may be infected as it is weeping. She endorses warmth to touch, which improved with cold compresses, and itchiness. Pt reports growth over night and chills today, which she says \"might be in her head. \"     Review of Systems   All other systems reviewed and are negative.        No data recorded     Physical Exam  Skin:               [INSTRUCTIONS:  \"[x]\" Indicates a positive item  \"[]\" Indicates a negative item  -- DELETE ALL ITEMS NOT EXAMINED]    Constitutional: [x] Appears well-developed and well-nourished [x] No apparent distress      [] Abnormal -     Mental status: [x] Alert and awake  [x] Oriented to person/place/time [x] Able to follow commands    [] Abnormal -     Eyes:   EOM    [x]  Normal    [] Abnormal -   Sclera  [x]  Normal    [] Abnormal -          Discharge [x]  None visible   [] Abnormal -     HENT: [x] Normocephalic, atraumatic  [] Abnormal -   [x] Mouth/Throat: Mucous membranes are moist    External Ears [x] Normal  [] Abnormal -    Neck: [x] No visualized mass [] Abnormal -     Pulmonary/Chest: [x] Respiratory effort normal   [x] No visualized signs of difficulty breathing or respiratory distress        [] Abnormal -      Musculoskeletal:   [x] Normal gait with no signs of ataxia         [x] Normal range of motion of neck        [] Abnormal -     Neurological:        [x] No Facial Asymmetry (Cranial nerve 7 motor function) (limited exam due to video visit)          [x] No gaze palsy        [] Abnormal -          Skin:        [x] No significant exanthematous lesions or discoloration noted on facial skin         [] Abnormal -            Psychiatric:       [x] Normal Affect [] Abnormal -        [x] No Hallucinations    Other pertinent observable physical exam findings:-        On this date 06/15/2022 I have spent 25 minutes reviewing previous notes, test results and face to face (virtual) with the patient discussing the diagnosis and importance of compliance with the treatment plan as well as documenting on the day of the visit. Brisa Sneed, was evaluated through a synchronous (real-time) audio-video encounter. The patient (or guardian if applicable) is aware that this is a billable service, which includes applicable co-pays. This Virtual Visit was conducted with patient's (and/or legal guardian's) consent. The visit was conducted pursuant to the emergency declaration under the [address] [place] [address] authority and the [facility] and [employer] General Act. Patient identification was verified, and a caregiver was present when appropriate. The patient was located at: Home: [address]  The provider was located at: Facility (Appt Department): [address]       An electronic signature was used to authenticate this note. Written by Didier Whiting as dictated by Dr. Kizzy Alberto.    -- Didier Whiting

## 2022-09-28 NOTE — PROGRESS NOTES
Nina Urrutia (: 1965) is a 62 y.o. female, established patient, here for evaluation of the following chief complaint(s):  Physical       ASSESSMENT/PLAN:  Below is the assessment and plan developed based on review of pertinent history, physical exam, labs, studies, and medications. 1. Routine general medical examination at a health care facility  -     CBC W/O DIFF; Future  2. Anxiety-she is doing really well on her Lexapro 10 mg once a day and continue with counseling as she is doing  -     TSH 3RD GENERATION; Future  3. Mixed hyperlipidemia-continue with Crestor 5 mg once a day  -     METABOLIC PANEL, COMPREHENSIVE; Future  -     LIPID PANEL; Future  4. Other secondary hypertension-we can start a low-dose of blood pressure medicine as it is trending up she is can start off just taking half a tablet a day and see how it helps with her higher numbers that she is getting the evening which are more 130s over 80s, although there is lower ones in the morning like 100s over 70s  -     hydroCHLOROthiazide (HYDRODIURIL) 25 mg tablet; Take 1 Tablet by mouth daily. , Normal, Disp-90 Tablet, R-1  5. Needs flu shot  -     INFLUENZA, FLUARIX, FLULAVAL, FLUZONE (AGE 6 MO+), AFLURIA(AGE 3Y+) IM, PF, 0.5 ML  6. Right wrist pain-she has a ganglion cyst in her right wrist that needs to be addressed so we will refer her to Dr. Lane Archibald  -     [place] [employer]    No follow-ups on file. She also complained of left-sided nerve pain occasionally as well as some lower back pain from doing the yoga and the [place]'s its not getting better and she needs to take anti-inflammatories for I recommended her doing some physical therapy to strengthen up her core and her back-gave her prescription for physical therapy for her back to go [facility]    SUBJECTIVE/OBJECTIVE:  HPI  Subjective:   Nina Urrutia is a 62 y.o. female with hyperlipidemia. Cardiovascular risk analysis - 62 y.o. female LDL goal is under 130.    ROS: taking medications as instructed, no medication side effects noted, no TIA's, no chest pain on exertion, no dyspnea on exertion, no swelling of ankles. Tolerating meds, no myalgias or other side effects noted  New concerns: she ate today . Lab Results   Component Value Date/Time    Cholesterol, total 196 08/24/2021 08:48 AM    HDL Cholesterol 56 08/24/2021 08:48 AM    LDL, calculated 122 (H) 08/24/2021 08:48 AM    LDL, calculated 88 04/12/2019 08:15 AM    VLDL, calculated 18 08/24/2021 08:48 AM    VLDL, calculated 20 04/12/2019 08:15 AM    Triglyceride 99 08/24/2021 08:48 AM    CHOL/HDL Ratio 2.8 09/16/2009 09:00 AM     She also takes lexapro for her anxiety and has been well controlled; she started her doctorate and therapy and doing hot yoga     Subjective:   Katerin Alva is a 62 y.o. female with hypertension. Hypertension ROS: taking medications as instructed, no medication side effects noted, no TIA's, no chest pain on exertion, no dyspnea on exertion, no swelling of ankles. New concerns: has been higher needs to do labs today . Her right wrist- has been hurting with yoga and burpees and she has  cyst there  Her back does hurt from yoga and some of high impact     Review of Systems   All other systems reviewed and are negative. Physical Exam  Vitals and nursing note reviewed. Constitutional:       Appearance: Normal appearance. She is well-developed. HENT:      Head: Normocephalic and atraumatic. Right Ear: External ear normal.      Left Ear: External ear normal.      Nose: Nose normal.   Eyes:      Conjunctiva/sclera: Conjunctivae normal.      Pupils: Pupils are equal, round, and reactive to light. Neck:      Thyroid: No thyromegaly. Vascular: No carotid bruit. Cardiovascular:      Rate and Rhythm: Normal rate and regular rhythm.       Heart sounds: Normal heart sounds, S1 normal and S2 normal.   Pulmonary:      Effort: Pulmonary effort is normal.      Breath sounds: Normal breath sounds. Abdominal:      General: Bowel sounds are normal.      Palpations: Abdomen is soft. Tenderness: There is no abdominal tenderness. Musculoskeletal:         General: Normal range of motion. Cervical back: Normal range of motion and neck supple. Skin:     General: Skin is warm and dry. Neurological:      Mental Status: She is alert and oriented to person, place, and time. Psychiatric:         Behavior: Behavior normal.         Thought Content: Thought content normal.         Judgment: Judgment normal.       On this date 09/29/2022 I have spent 30 minutes reviewing previous notes, test results and face to face with the patient discussing the diagnosis and importance of compliance with the treatment plan as well as documenting on the day of the visit. An electronic signature was used to authenticate this note.   -- Noel Jack MD

## 2022-09-29 ENCOUNTER — OFFICE VISIT (OUTPATIENT)
Dept: INTERNAL MEDICINE CLINIC | Age: 57
End: 2022-09-29
Payer: COMMERCIAL

## 2022-09-29 VITALS
TEMPERATURE: 98.1 F | HEIGHT: 64 IN | WEIGHT: 177 LBS | RESPIRATION RATE: 16 BRPM | DIASTOLIC BLOOD PRESSURE: 83 MMHG | SYSTOLIC BLOOD PRESSURE: 128 MMHG | OXYGEN SATURATION: 98 % | HEART RATE: 107 BPM | BODY MASS INDEX: 30.22 KG/M2

## 2022-09-29 DIAGNOSIS — I15.8 OTHER SECONDARY HYPERTENSION: ICD-10-CM

## 2022-09-29 DIAGNOSIS — F41.9 ANXIETY: ICD-10-CM

## 2022-09-29 DIAGNOSIS — E78.2 MIXED HYPERLIPIDEMIA: ICD-10-CM

## 2022-09-29 DIAGNOSIS — M25.531 RIGHT WRIST PAIN: ICD-10-CM

## 2022-09-29 DIAGNOSIS — Z23 NEEDS FLU SHOT: ICD-10-CM

## 2022-09-29 DIAGNOSIS — Z00.00 ROUTINE GENERAL MEDICAL EXAMINATION AT A HEALTH CARE FACILITY: Primary | ICD-10-CM

## 2022-09-29 PROCEDURE — 99396 PREV VISIT EST AGE 40-64: CPT | Performed by: INTERNAL MEDICINE

## 2022-09-29 PROCEDURE — 90686 IIV4 VACC NO PRSV 0.5 ML IM: CPT | Performed by: INTERNAL MEDICINE

## 2022-09-29 PROCEDURE — 99213 OFFICE O/P EST LOW 20 MIN: CPT | Performed by: INTERNAL MEDICINE

## 2022-09-29 PROCEDURE — 90471 IMMUNIZATION ADMIN: CPT | Performed by: INTERNAL MEDICINE

## 2022-09-29 RX ORDER — NYSTATIN 100000 [USP'U]/G
POWDER TOPICAL 4 TIMES DAILY
Qty: 60 G | Refills: 3 | Status: SHIPPED | OUTPATIENT
Start: 2022-09-29

## 2022-09-29 RX ORDER — HYDROCHLOROTHIAZIDE 25 MG/1
25 TABLET ORAL DAILY
Qty: 90 TABLET | Refills: 1 | Status: SHIPPED | OUTPATIENT
Start: 2022-09-29

## 2022-10-08 LAB
ALBUMIN SERPL-MCNC: 4.6 G/DL (ref 3.8–4.9)
ALBUMIN/GLOB SERPL: 2 {RATIO} (ref 1.2–2.2)
ALP SERPL-CCNC: 94 IU/L (ref 44–121)
ALT SERPL-CCNC: 21 IU/L (ref 0–32)
AST SERPL-CCNC: 23 IU/L (ref 0–40)
BILIRUB SERPL-MCNC: 0.9 MG/DL (ref 0–1.2)
BUN SERPL-MCNC: 12 MG/DL (ref 6–24)
BUN/CREAT SERPL: 16 (ref 9–23)
CALCIUM SERPL-MCNC: 9.2 MG/DL (ref 8.7–10.2)
CHLORIDE SERPL-SCNC: 104 MMOL/L (ref 96–106)
CHOLEST SERPL-MCNC: 150 MG/DL (ref 100–199)
CO2 SERPL-SCNC: 24 MMOL/L (ref 20–29)
CREAT SERPL-MCNC: 0.76 MG/DL (ref 0.57–1)
EGFR: 91 ML/MIN/1.73
ERYTHROCYTE [DISTWIDTH] IN BLOOD BY AUTOMATED COUNT: 11.4 % (ref 11.7–15.4)
GLOBULIN SER CALC-MCNC: 2.3 G/DL (ref 1.5–4.5)
GLUCOSE SERPL-MCNC: 87 MG/DL (ref 70–99)
HCT VFR BLD AUTO: 42.7 % (ref 34–46.6)
HDLC SERPL-MCNC: 52 MG/DL
HGB BLD-MCNC: 14.2 G/DL (ref 11.1–15.9)
IMP & REVIEW OF LAB RESULTS: NORMAL
LDLC SERPL CALC-MCNC: 78 MG/DL (ref 0–99)
MCH RBC QN AUTO: 31.6 PG (ref 26.6–33)
MCHC RBC AUTO-ENTMCNC: 33.3 G/DL (ref 31.5–35.7)
MCV RBC AUTO: 95 FL (ref 79–97)
PLATELET # BLD AUTO: 373 X10E3/UL (ref 150–450)
POTASSIUM SERPL-SCNC: 4.5 MMOL/L (ref 3.5–5.2)
PROT SERPL-MCNC: 6.9 G/DL (ref 6–8.5)
RBC # BLD AUTO: 4.5 X10E6/UL (ref 3.77–5.28)
SODIUM SERPL-SCNC: 141 MMOL/L (ref 134–144)
TRIGL SERPL-MCNC: 110 MG/DL (ref 0–149)
TSH SERPL DL<=0.005 MIU/L-ACNC: 2.3 UIU/ML (ref 0.45–4.5)
VLDLC SERPL CALC-MCNC: 20 MG/DL (ref 5–40)
WBC # BLD AUTO: 5 X10E3/UL (ref 3.4–10.8)

## 2022-10-09 DIAGNOSIS — Z00.00 ROUTINE GENERAL MEDICAL EXAMINATION AT A HEALTH CARE FACILITY: ICD-10-CM

## 2022-10-09 RX ORDER — ESCITALOPRAM OXALATE 10 MG/1
TABLET ORAL
Qty: 90 TABLET | Refills: 1 | Status: SHIPPED | OUTPATIENT
Start: 2022-10-09

## 2022-10-09 RX ORDER — ROSUVASTATIN CALCIUM 5 MG/1
TABLET, COATED ORAL
Qty: 90 TABLET | Refills: 1 | Status: SHIPPED | OUTPATIENT
Start: 2022-10-09

## 2023-03-25 RX ORDER — ESCITALOPRAM OXALATE 10 MG/1
TABLET ORAL
Qty: 90 TABLET | Refills: 1 | Status: SHIPPED | OUTPATIENT
Start: 2023-03-25

## 2023-03-29 DIAGNOSIS — I15.8 OTHER SECONDARY HYPERTENSION: ICD-10-CM

## 2023-03-29 RX ORDER — HYDROCHLOROTHIAZIDE 25 MG/1
TABLET ORAL
Qty: 90 TABLET | Refills: 1 | Status: SHIPPED | OUTPATIENT
Start: 2023-03-29

## 2023-03-31 DIAGNOSIS — Z00.00 ROUTINE GENERAL MEDICAL EXAMINATION AT A HEALTH CARE FACILITY: ICD-10-CM

## 2023-03-31 RX ORDER — ROSUVASTATIN CALCIUM 5 MG/1
TABLET, COATED ORAL
Qty: 90 TABLET | Refills: 1 | Status: SHIPPED | OUTPATIENT
Start: 2023-03-31

## 2023-11-03 RX ORDER — ESCITALOPRAM OXALATE 10 MG/1
10 TABLET ORAL DAILY
Qty: 90 TABLET | Refills: 1 | Status: SHIPPED | OUTPATIENT
Start: 2023-11-03

## 2023-11-14 DIAGNOSIS — Z00.00 ENCOUNTER FOR GENERAL ADULT MEDICAL EXAMINATION WITHOUT ABNORMAL FINDINGS: ICD-10-CM

## 2023-11-14 RX ORDER — ROSUVASTATIN CALCIUM 5 MG/1
5 TABLET, COATED ORAL
Qty: 90 TABLET | Refills: 1 | Status: SHIPPED | OUTPATIENT
Start: 2023-11-14

## 2024-02-20 NOTE — PROGRESS NOTES
Keke Mccabe (:  1965) is a 59 y.o. female,Established patient, here for evaluation of the following chief complaint(s):  Annual Exam (Pt is fasting; last pap 4 years ago)         ASSESSMENT/PLAN:  1. Routine Papanicolaou smear  2. Anxiety disorder, unspecified type-continue with Lexapro 5 mg exercising strength training has definitely made a difference in terms of her mental and physical health  -     TSH; Future  3. Mixed hyperlipidemia-continue Crestor she does have a follow-up with Dr. Herron  -     Lipid Panel; Future  -     Comprehensive Metabolic Panel; Future  4. Other secondary hypertension-not at goal in the office but apparently does better at home we will send her an e-visit to communicate in a month or recordings and how she is doing so we can decide next steps.  HCTZ caused way too much dizziness as she does sweat a lot when she exercises so I may consider olmesartan low-dose if needed  -     Darby Joyceit  -     CBC; Future  -     Hemoglobin A1C; Future      No follow-ups on file.       Lexapro   Crestor  Hctz   Subjective   SUBJECTIVE/OBJECTIVE:  HPI    Here for a pap and physical  She is doing hot yoga - three times a week - kick boxing at [employer] strength and walking the dog     Doing doctorate on clinical nutrition       Subjective:   Keke Mccabe is a 57 y.o. female with hyperlipidemia.     Cardiovascular risk analysis - 57 y.o. female LDL goal is under 130.   ROS: taking medications as instructed, no medication side effects noted, no TIA's, no chest pain on exertion, no dyspnea on exertion, no swelling of ankles. Tolerating meds, no myalgias or other side effects noted  New concerns: she ate today .      Lab Results   Component Value Date    CHOL 150 10/07/2022     Lab Results   Component Value Date    TRIG 110 10/07/2022     Lab Results   Component Value Date    HDL 52 10/07/2022     Lab Results   Component Value Date    LDLCALC 78 10/07/2022     Lab Results   Component Value Date

## 2024-02-21 ENCOUNTER — OFFICE VISIT (OUTPATIENT)
Age: 59
End: 2024-02-21
Payer: COMMERCIAL

## 2024-02-21 ENCOUNTER — HOSPITAL ENCOUNTER (OUTPATIENT)
Facility: HOSPITAL | Age: 59
Setting detail: SPECIMEN
Discharge: HOME | End: 2024-02-24
Payer: COMMERCIAL

## 2024-02-21 VITALS
OXYGEN SATURATION: 97 % | BODY MASS INDEX: 29.81 KG/M2 | RESPIRATION RATE: 16 BRPM | HEART RATE: 99 BPM | SYSTOLIC BLOOD PRESSURE: 146 MMHG | DIASTOLIC BLOOD PRESSURE: 98 MMHG | HEIGHT: 64 IN | WEIGHT: 174.6 LBS | TEMPERATURE: 98.3 F

## 2024-02-21 DIAGNOSIS — E78.2 MIXED HYPERLIPIDEMIA: ICD-10-CM

## 2024-02-21 DIAGNOSIS — F41.9 ANXIETY DISORDER, UNSPECIFIED TYPE: ICD-10-CM

## 2024-02-21 DIAGNOSIS — Z12.4 ROUTINE PAPANICOLAOU SMEAR: Primary | ICD-10-CM

## 2024-02-21 DIAGNOSIS — I15.8 OTHER SECONDARY HYPERTENSION: ICD-10-CM

## 2024-02-21 LAB
ERYTHROCYTE [DISTWIDTH] IN BLOOD BY AUTOMATED COUNT: 11.7 % (ref 11.5–14.5)
EST. AVERAGE GLUCOSE BLD GHB EST-MCNC: 100 MG/DL
HBA1C MFR BLD: 5.1 % (ref 4–5.6)
HCT VFR BLD AUTO: 46.1 % (ref 35–47)
HGB BLD-MCNC: 15.4 G/DL (ref 11.5–16)
MCH RBC QN AUTO: 31.8 PG (ref 26–34)
MCHC RBC AUTO-ENTMCNC: 33.4 G/DL (ref 30–36.5)
MCV RBC AUTO: 95.2 FL (ref 80–99)
NRBC # BLD: 0 K/UL (ref 0–0.01)
NRBC BLD-RTO: 0 PER 100 WBC
PLATELET # BLD AUTO: 355 K/UL (ref 150–400)
PMV BLD AUTO: 10 FL (ref 8.9–12.9)
RBC # BLD AUTO: 4.84 M/UL (ref 3.8–5.2)
WBC # BLD AUTO: 6.1 K/UL (ref 3.6–11)

## 2024-02-21 PROCEDURE — 87624 HPV HI-RISK TYP POOLED RSLT: CPT

## 2024-02-21 PROCEDURE — 99396 PREV VISIT EST AGE 40-64: CPT | Performed by: INTERNAL MEDICINE

## 2024-02-21 PROCEDURE — 88175 CYTOPATH C/V AUTO FLUID REDO: CPT

## 2024-02-21 PROCEDURE — 99213 OFFICE O/P EST LOW 20 MIN: CPT | Performed by: INTERNAL MEDICINE

## 2024-02-21 SDOH — ECONOMIC STABILITY: FOOD INSECURITY: WITHIN THE PAST 12 MONTHS, THE FOOD YOU BOUGHT JUST DIDN'T LAST AND YOU DIDN'T HAVE MONEY TO GET MORE.: NEVER TRUE

## 2024-02-21 SDOH — ECONOMIC STABILITY: HOUSING INSECURITY
IN THE LAST 12 MONTHS, WAS THERE A TIME WHEN YOU DID NOT HAVE A STEADY PLACE TO SLEEP OR SLEPT IN A SHELTER (INCLUDING NOW)?: NO

## 2024-02-21 SDOH — ECONOMIC STABILITY: INCOME INSECURITY: HOW HARD IS IT FOR YOU TO PAY FOR THE VERY BASICS LIKE FOOD, HOUSING, MEDICAL CARE, AND HEATING?: NOT HARD AT ALL

## 2024-02-21 SDOH — ECONOMIC STABILITY: FOOD INSECURITY: WITHIN THE PAST 12 MONTHS, YOU WORRIED THAT YOUR FOOD WOULD RUN OUT BEFORE YOU GOT MONEY TO BUY MORE.: NEVER TRUE

## 2024-02-21 ASSESSMENT — PATIENT HEALTH QUESTIONNAIRE - PHQ9
SUM OF ALL RESPONSES TO PHQ9 QUESTIONS 1 & 2: 0
SUM OF ALL RESPONSES TO PHQ QUESTIONS 1-9: 0
1. LITTLE INTEREST OR PLEASURE IN DOING THINGS: 0
SUM OF ALL RESPONSES TO PHQ QUESTIONS 1-9: 0
2. FEELING DOWN, DEPRESSED OR HOPELESS: 0

## 2024-02-22 LAB
ALBUMIN SERPL-MCNC: 4.4 G/DL (ref 3.5–5)
ALBUMIN/GLOB SERPL: 1.3 (ref 1.1–2.2)
ALP SERPL-CCNC: 108 U/L (ref 45–117)
ALT SERPL-CCNC: 36 U/L (ref 12–78)
ANION GAP SERPL CALC-SCNC: 8 MMOL/L (ref 5–15)
AST SERPL-CCNC: 22 U/L (ref 15–37)
BILIRUB SERPL-MCNC: 1.6 MG/DL (ref 0.2–1)
BUN SERPL-MCNC: 16 MG/DL (ref 6–20)
BUN/CREAT SERPL: 16 (ref 12–20)
CALCIUM SERPL-MCNC: 9.7 MG/DL (ref 8.5–10.1)
CHLORIDE SERPL-SCNC: 104 MMOL/L (ref 97–108)
CHOLEST SERPL-MCNC: 183 MG/DL
CO2 SERPL-SCNC: 28 MMOL/L (ref 21–32)
CREAT SERPL-MCNC: 0.97 MG/DL (ref 0.55–1.02)
GLOBULIN SER CALC-MCNC: 3.4 G/DL (ref 2–4)
GLUCOSE SERPL-MCNC: 102 MG/DL (ref 65–100)
HDLC SERPL-MCNC: 73 MG/DL
HDLC SERPL: 2.5 (ref 0–5)
LDLC SERPL CALC-MCNC: 83.8 MG/DL (ref 0–100)
POTASSIUM SERPL-SCNC: 4 MMOL/L (ref 3.5–5.1)
PROT SERPL-MCNC: 7.8 G/DL (ref 6.4–8.2)
SODIUM SERPL-SCNC: 140 MMOL/L (ref 136–145)
TRIGL SERPL-MCNC: 131 MG/DL
TSH SERPL DL<=0.05 MIU/L-ACNC: 2.16 UIU/ML (ref 0.36–3.74)
VLDLC SERPL CALC-MCNC: 26.2 MG/DL

## 2024-02-29 ENCOUNTER — TRANSCRIBE ORDERS (OUTPATIENT)
Facility: HOSPITAL | Age: 59
End: 2024-02-29

## 2024-02-29 DIAGNOSIS — Z12.31 SCREENING MAMMOGRAM FOR HIGH-RISK PATIENT: Primary | ICD-10-CM

## 2024-03-21 ENCOUNTER — E-VISIT (OUTPATIENT)
Age: 59
End: 2024-03-21
Payer: COMMERCIAL

## 2024-03-21 DIAGNOSIS — I10 PRIMARY HYPERTENSION: Primary | ICD-10-CM

## 2024-03-21 PROCEDURE — 99422 OL DIG E/M SVC 11-20 MIN: CPT | Performed by: INTERNAL MEDICINE

## 2024-05-01 ENCOUNTER — HOSPITAL ENCOUNTER (OUTPATIENT)
Facility: HOSPITAL | Age: 59
Discharge: HOME | End: 2024-05-04
Attending: INTERNAL MEDICINE
Payer: COMMERCIAL

## 2024-05-01 DIAGNOSIS — Z12.31 SCREENING MAMMOGRAM FOR HIGH-RISK PATIENT: ICD-10-CM

## 2024-05-01 PROCEDURE — 77063 BREAST TOMOSYNTHESIS BI: CPT

## 2024-05-03 DIAGNOSIS — Z00.00 ENCOUNTER FOR GENERAL ADULT MEDICAL EXAMINATION WITHOUT ABNORMAL FINDINGS: ICD-10-CM

## 2024-05-03 RX ORDER — ROSUVASTATIN CALCIUM 5 MG/1
5 TABLET, COATED ORAL
Qty: 90 TABLET | Refills: 1 | Status: SHIPPED | OUTPATIENT
Start: 2024-05-03

## 2024-05-06 ENCOUNTER — HOSPITAL ENCOUNTER (OUTPATIENT)
Facility: HOSPITAL | Age: 59
Discharge: HOME | End: 2024-05-09
Payer: COMMERCIAL

## 2024-05-06 VITALS — BODY MASS INDEX: 29.71 KG/M2 | WEIGHT: 174 LBS | HEIGHT: 64 IN

## 2024-05-06 DIAGNOSIS — R92.8 ABNORMALITY OF RIGHT BREAST ON SCREENING MAMMOGRAM: ICD-10-CM

## 2024-05-06 PROCEDURE — G0279 TOMOSYNTHESIS, MAMMO: HCPCS

## 2024-07-22 RX ORDER — ESCITALOPRAM OXALATE 10 MG/1
10 TABLET ORAL DAILY
Qty: 90 TABLET | Refills: 1 | Status: SHIPPED | OUTPATIENT
Start: 2024-07-22

## 2024-08-26 DIAGNOSIS — I10 PRIMARY HYPERTENSION: Primary | ICD-10-CM

## 2024-09-16 ENCOUNTER — E-VISIT (OUTPATIENT)
Age: 59
End: 2024-09-16
Payer: COMMERCIAL

## 2024-09-16 DIAGNOSIS — I10 PRIMARY HYPERTENSION: Primary | ICD-10-CM

## 2024-09-16 PROCEDURE — 99422 OL DIG E/M SVC 11-20 MIN: CPT | Performed by: INTERNAL MEDICINE

## 2024-10-08 DIAGNOSIS — Z00.00 ENCOUNTER FOR GENERAL ADULT MEDICAL EXAMINATION WITHOUT ABNORMAL FINDINGS: ICD-10-CM

## 2024-10-08 RX ORDER — ROSUVASTATIN CALCIUM 5 MG/1
5 TABLET, COATED ORAL
Qty: 90 TABLET | Refills: 1 | Status: SHIPPED | OUTPATIENT
Start: 2024-10-08

## 2025-01-11 NOTE — TELEPHONE ENCOUNTER
Patient called following up on request for a refill on her Lexapro. Patient advised refill denied by PCP until appointment. Patient scheduled VV with PCP 2.24.2021, request refill as soon as possible today. Patient advised she is completely out of medication. Request a short supply if PCP is agreeable.      Please call to advise of sooner VV, or if PCP is able to accommodate patient's request.  x2, SAB x1

## 2025-01-24 DIAGNOSIS — F41.9 ANXIETY DISORDER, UNSPECIFIED TYPE: Primary | ICD-10-CM

## 2025-01-24 RX ORDER — ESCITALOPRAM OXALATE 10 MG/1
10 TABLET ORAL DAILY
Qty: 90 TABLET | Refills: 1 | Status: SHIPPED | OUTPATIENT
Start: 2025-01-24

## 2025-02-25 ENCOUNTER — PATIENT MESSAGE (OUTPATIENT)
Facility: CLINIC | Age: 60
End: 2025-02-25

## 2025-02-25 RX ORDER — HYDROCHLOROTHIAZIDE 25 MG/1
25 TABLET ORAL DAILY
Qty: 30 TABLET | Refills: 2 | Status: SHIPPED | OUTPATIENT
Start: 2025-02-25

## 2025-03-06 ENCOUNTER — PATIENT MESSAGE (OUTPATIENT)
Facility: CLINIC | Age: 60
End: 2025-03-06

## 2025-03-06 RX ORDER — NITROFURANTOIN 25; 75 MG/1; MG/1
100 CAPSULE ORAL 2 TIMES DAILY
Qty: 14 CAPSULE | Refills: 0 | Status: SHIPPED | OUTPATIENT
Start: 2025-03-06

## 2025-03-20 ENCOUNTER — PATIENT MESSAGE (OUTPATIENT)
Facility: CLINIC | Age: 60
End: 2025-03-20

## 2025-03-21 RX ORDER — CIPROFLOXACIN 500 MG/1
500 TABLET, FILM COATED ORAL 2 TIMES DAILY
Qty: 10 TABLET | Refills: 0 | Status: SHIPPED | OUTPATIENT
Start: 2025-03-21 | End: 2025-03-26

## 2025-03-21 RX ORDER — ESTRADIOL 0.1 MG/G
CREAM VAGINAL
Qty: 42.5 G | Refills: 3 | Status: SHIPPED | OUTPATIENT
Start: 2025-03-21

## 2025-03-26 DIAGNOSIS — Z00.00 ENCOUNTER FOR GENERAL ADULT MEDICAL EXAMINATION WITHOUT ABNORMAL FINDINGS: ICD-10-CM

## 2025-03-26 RX ORDER — ROSUVASTATIN CALCIUM 5 MG/1
5 TABLET, COATED ORAL
Qty: 90 TABLET | Refills: 0 | Status: SHIPPED | OUTPATIENT
Start: 2025-03-26

## 2025-04-26 NOTE — PROGRESS NOTES
Keke Mccabe (:  1965) is a 60 y.o. female,Established patient, here for evaluation of the following chief complaint(s):  Anxiety (Patient is fasting; discuss starting repatha)          Diagnosis Orders   1. Anxiety disorder, unspecified type        2. Mixed hyperlipidemia        3. Primary hypertension         Lexapro   Crestor  Hctz     Assessment & Plan  1. Blood pressure management.  Her blood pressure readings have been consistently within the normal range, averaging around 110/72 mmHg at home.  Reports no side effects from her current medication, hydrochlorothiazide.  Advised to continue her current regimen of hydrochlorothiazide, to be taken every other day or as needed.  Instructed to monitor her blood pressure at different times of the day, specifically in the morning, afternoon, and evening, to gather more data.    2. Cholesterol management.  Currently on rosuvastatin and reports no issues.  Given her family history of heart disease, advised to consult with a cardiologist regarding the potential use of Repatha.  Last CT heart scan was score of zero     3. Urinary tract infection.  A prescription for Macrobid 100 mg, consisting of 30 tablets, will be provided.  Instructed to take this medication twice daily for a duration of 5 to 7 days as needed during her travels.  Reports significant improvement with the use of the cream.  No systemic absorption concerns with the current treatment.    4.  Anxiety-continue with Lexapro very well-controlled          Subjective   HPI  History of Present Illness  The patient presents for evaluation of blood pressure, cholesterol, and urinary tract infection. She is accompanied by her son, who is planning to propose soon.    Blood pressure is being monitored at home, typically registering around 110/72. Transient increases in blood pressure upon standing are noted, normalizing to approximately 120/80 within 5 minutes. No adverse effects from hydrochlorothiazide,

## 2025-04-28 ENCOUNTER — OFFICE VISIT (OUTPATIENT)
Facility: CLINIC | Age: 60
End: 2025-04-28
Payer: COMMERCIAL

## 2025-04-28 VITALS
DIASTOLIC BLOOD PRESSURE: 93 MMHG | WEIGHT: 178.8 LBS | BODY MASS INDEX: 30.52 KG/M2 | TEMPERATURE: 97.5 F | HEIGHT: 64 IN | RESPIRATION RATE: 16 BRPM | HEART RATE: 99 BPM | OXYGEN SATURATION: 98 % | SYSTOLIC BLOOD PRESSURE: 155 MMHG

## 2025-04-28 DIAGNOSIS — F41.9 ANXIETY DISORDER, UNSPECIFIED TYPE: Primary | ICD-10-CM

## 2025-04-28 DIAGNOSIS — Z83.3 FH: DIABETES MELLITUS: ICD-10-CM

## 2025-04-28 DIAGNOSIS — I10 PRIMARY HYPERTENSION: ICD-10-CM

## 2025-04-28 DIAGNOSIS — N39.0 RECURRENT UTI: ICD-10-CM

## 2025-04-28 DIAGNOSIS — E78.2 MIXED HYPERLIPIDEMIA: ICD-10-CM

## 2025-04-28 LAB
ALBUMIN SERPL-MCNC: 4.2 G/DL (ref 3.5–5)
ALBUMIN/GLOB SERPL: 1.3 (ref 1.1–2.2)
ALP SERPL-CCNC: 91 U/L (ref 45–117)
ALT SERPL-CCNC: 36 U/L (ref 12–78)
ANION GAP SERPL CALC-SCNC: 5 MMOL/L (ref 2–12)
AST SERPL-CCNC: 21 U/L (ref 15–37)
BILIRUB SERPL-MCNC: 1.3 MG/DL (ref 0.2–1)
BUN SERPL-MCNC: 13 MG/DL (ref 6–20)
BUN/CREAT SERPL: 16 (ref 12–20)
CALCIUM SERPL-MCNC: 9.4 MG/DL (ref 8.5–10.1)
CHLORIDE SERPL-SCNC: 103 MMOL/L (ref 97–108)
CHOLEST SERPL-MCNC: 159 MG/DL
CO2 SERPL-SCNC: 30 MMOL/L (ref 21–32)
CREAT SERPL-MCNC: 0.82 MG/DL (ref 0.55–1.02)
ERYTHROCYTE [DISTWIDTH] IN BLOOD BY AUTOMATED COUNT: 11.5 % (ref 11.5–14.5)
EST. AVERAGE GLUCOSE BLD GHB EST-MCNC: 91 MG/DL
GLOBULIN SER CALC-MCNC: 3.2 G/DL (ref 2–4)
GLUCOSE SERPL-MCNC: 115 MG/DL (ref 65–100)
HBA1C MFR BLD: 4.8 % (ref 4–5.6)
HCT VFR BLD AUTO: 43.5 % (ref 35–47)
HDLC SERPL-MCNC: 62 MG/DL
HDLC SERPL: 2.6 (ref 0–5)
HGB BLD-MCNC: 14.7 G/DL (ref 11.5–16)
LDLC SERPL CALC-MCNC: 72 MG/DL (ref 0–100)
MCH RBC QN AUTO: 31.7 PG (ref 26–34)
MCHC RBC AUTO-ENTMCNC: 33.8 G/DL (ref 30–36.5)
MCV RBC AUTO: 93.8 FL (ref 80–99)
NRBC # BLD: 0 K/UL (ref 0–0.01)
NRBC BLD-RTO: 0 PER 100 WBC
PLATELET # BLD AUTO: 371 K/UL (ref 150–400)
PMV BLD AUTO: 9.9 FL (ref 8.9–12.9)
POTASSIUM SERPL-SCNC: 3.7 MMOL/L (ref 3.5–5.1)
PROT SERPL-MCNC: 7.4 G/DL (ref 6.4–8.2)
RBC # BLD AUTO: 4.64 M/UL (ref 3.8–5.2)
SODIUM SERPL-SCNC: 138 MMOL/L (ref 136–145)
TRIGL SERPL-MCNC: 125 MG/DL
VLDLC SERPL CALC-MCNC: 25 MG/DL
WBC # BLD AUTO: 5.2 K/UL (ref 3.6–11)

## 2025-04-28 PROCEDURE — 3077F SYST BP >= 140 MM HG: CPT | Performed by: INTERNAL MEDICINE

## 2025-04-28 PROCEDURE — 3080F DIAST BP >= 90 MM HG: CPT | Performed by: INTERNAL MEDICINE

## 2025-04-28 PROCEDURE — 99214 OFFICE O/P EST MOD 30 MIN: CPT | Performed by: INTERNAL MEDICINE

## 2025-04-28 RX ORDER — NITROFURANTOIN 25; 75 MG/1; MG/1
100 CAPSULE ORAL PRN
Qty: 30 CAPSULE | Refills: 0 | Status: SHIPPED | OUTPATIENT
Start: 2025-04-28 | End: 2025-05-28

## 2025-04-28 SDOH — ECONOMIC STABILITY: FOOD INSECURITY: WITHIN THE PAST 12 MONTHS, THE FOOD YOU BOUGHT JUST DIDN'T LAST AND YOU DIDN'T HAVE MONEY TO GET MORE.: NEVER TRUE

## 2025-04-28 SDOH — ECONOMIC STABILITY: FOOD INSECURITY: WITHIN THE PAST 12 MONTHS, YOU WORRIED THAT YOUR FOOD WOULD RUN OUT BEFORE YOU GOT MONEY TO BUY MORE.: NEVER TRUE

## 2025-04-28 ASSESSMENT — PATIENT HEALTH QUESTIONNAIRE - PHQ9
SUM OF ALL RESPONSES TO PHQ QUESTIONS 1-9: 0
1. LITTLE INTEREST OR PLEASURE IN DOING THINGS: NOT AT ALL
SUM OF ALL RESPONSES TO PHQ QUESTIONS 1-9: 0
2. FEELING DOWN, DEPRESSED OR HOPELESS: NOT AT ALL
SUM OF ALL RESPONSES TO PHQ QUESTIONS 1-9: 0
SUM OF ALL RESPONSES TO PHQ QUESTIONS 1-9: 0

## 2025-04-29 RX ORDER — HYDROCHLOROTHIAZIDE 25 MG/1
25 TABLET ORAL DAILY
Qty: 30 TABLET | Refills: 2 | Status: SHIPPED | OUTPATIENT
Start: 2025-04-29

## 2025-06-24 ENCOUNTER — HOSPITAL ENCOUNTER (OUTPATIENT)
Facility: HOSPITAL | Age: 60
Discharge: HOME | End: 2025-06-27
Payer: COMMERCIAL

## 2025-06-24 VITALS — HEIGHT: 64 IN | BODY MASS INDEX: 30.39 KG/M2 | WEIGHT: 178 LBS

## 2025-06-24 DIAGNOSIS — Z12.31 VISIT FOR SCREENING MAMMOGRAM: ICD-10-CM

## 2025-06-24 PROCEDURE — 77063 BREAST TOMOSYNTHESIS BI: CPT

## 2025-06-28 DIAGNOSIS — Z00.00 ENCOUNTER FOR GENERAL ADULT MEDICAL EXAMINATION WITHOUT ABNORMAL FINDINGS: ICD-10-CM

## 2025-06-29 RX ORDER — ROSUVASTATIN CALCIUM 5 MG/1
5 TABLET, COATED ORAL
Qty: 90 TABLET | Refills: 1 | Status: SHIPPED | OUTPATIENT
Start: 2025-06-29

## 2025-07-08 ENCOUNTER — E-VISIT (OUTPATIENT)
Facility: CLINIC | Age: 60
End: 2025-07-08
Payer: COMMERCIAL

## 2025-07-08 DIAGNOSIS — M54.50 ACUTE BILATERAL LOW BACK PAIN WITHOUT SCIATICA: Primary | ICD-10-CM

## 2025-07-08 PROCEDURE — 99422 OL DIG E/M SVC 11-20 MIN: CPT | Performed by: INTERNAL MEDICINE

## 2025-07-08 RX ORDER — CYCLOBENZAPRINE HCL 10 MG
10 TABLET ORAL 3 TIMES DAILY PRN
Qty: 30 TABLET | Refills: 3 | Status: SHIPPED | OUTPATIENT
Start: 2025-07-08 | End: 2025-08-17

## 2025-07-08 RX ORDER — DICLOFENAC SODIUM 75 MG/1
75 TABLET, DELAYED RELEASE ORAL 2 TIMES DAILY PRN
Qty: 60 TABLET | Refills: 1 | Status: SHIPPED | OUTPATIENT
Start: 2025-07-08

## 2025-07-23 DIAGNOSIS — F41.9 ANXIETY DISORDER, UNSPECIFIED TYPE: ICD-10-CM

## 2025-07-23 RX ORDER — ESCITALOPRAM OXALATE 10 MG/1
10 TABLET ORAL DAILY
Qty: 90 TABLET | Refills: 1 | Status: SHIPPED | OUTPATIENT
Start: 2025-07-23

## 2025-07-23 NOTE — TELEPHONE ENCOUNTER
Last visit 4/28/25  No follow up scheduled     Lab Results   Component Value Date     04/28/2025    K 3.7 04/28/2025     04/28/2025    CO2 30 04/28/2025    BUN 13 04/28/2025    CREATININE 0.82 04/28/2025    GLUCOSE 115 (H) 04/28/2025    CALCIUM 9.4 04/28/2025    BILITOT 1.3 (H) 04/28/2025    ALKPHOS 91 04/28/2025    AST 21 04/28/2025    ALT 36 04/28/2025    LABGLOM 82 04/28/2025    GFRAA 103 08/24/2021    AGRATIO 2.0 10/07/2022    GLOB 3.2 04/28/2025     Lab Results   Component Value Date    WBC 5.2 04/28/2025    HGB 14.7 04/28/2025    HCT 43.5 04/28/2025    MCV 93.8 04/28/2025     04/28/2025